# Patient Record
Sex: MALE | Race: WHITE | Employment: OTHER | URBAN - METROPOLITAN AREA
[De-identification: names, ages, dates, MRNs, and addresses within clinical notes are randomized per-mention and may not be internally consistent; named-entity substitution may affect disease eponyms.]

---

## 2017-01-24 ENCOUNTER — OFFICE VISIT (OUTPATIENT)
Dept: OPHTHALMOLOGY | Facility: CLINIC | Age: 62
End: 2017-01-24

## 2017-01-24 DIAGNOSIS — H20.023 RECURRENT IRITIS OF BOTH EYES: Primary | ICD-10-CM

## 2017-01-24 PROCEDURE — 99212 OFFICE O/P EST SF 10 MIN: CPT | Performed by: OPHTHALMOLOGY

## 2017-01-24 PROCEDURE — 99213 OFFICE O/P EST LOW 20 MIN: CPT | Performed by: OPHTHALMOLOGY

## 2017-01-24 RX ORDER — PREDNISOLONE ACETATE 10 MG/ML
SUSPENSION/ DROPS OPHTHALMIC
Qty: 1 BOTTLE | Refills: 1 | Status: SHIPPED | OUTPATIENT
Start: 2017-01-24 | End: 2017-02-23

## 2017-01-24 NOTE — PROGRESS NOTES
Bennie Chi is a 57year old male. HPI:     HPI     Patient is here for a recheck of iritis of both eyes. He is taking Pred forte 1% 2x a day in both eyes.   Patient states that his left eye became red on 1/21/17 and this morning he had pain of 2 daily PRN Disp: 120 tablet Rfl: 5   PARoxetine HCl 20 MG Oral Tab TAKE 1 AND 1/2 TABLETS(30 MG) BY MOUTH EVERY MORNING Disp: 135 tablet Rfl: 1   FLUTICASONE PROPIONATE 50 MCG/ACT Nasal Suspension SHAKE LIQUID AND USE 2 SPRAYS IN EACH NOSTRIL DAILY Disp: 3 No orders of the defined types were placed in this encounter.        Meds This Visit:    Signed Prescriptions Disp Refills    prednisoLONE acetate (PRED FORTE) 1 % Ophthalmic Suspension 1 Bottle 1      Sig: Use one drop in both eyes once a day

## 2017-01-24 NOTE — PATIENT INSTRUCTIONS
Recurrent iritis of both eyes  Decrease Pred forte to 1 drop in both eyes once a day until next visit in 4 weeks.

## 2017-02-08 ENCOUNTER — APPOINTMENT (OUTPATIENT)
Dept: GENERAL RADIOLOGY | Facility: HOSPITAL | Age: 62
End: 2017-02-08
Payer: COMMERCIAL

## 2017-02-08 ENCOUNTER — HOSPITAL ENCOUNTER (EMERGENCY)
Facility: HOSPITAL | Age: 62
Discharge: HOME | End: 2017-02-08
Payer: COMMERCIAL

## 2017-02-08 ENCOUNTER — APPOINTMENT (OUTPATIENT)
Dept: GENERAL RADIOLOGY | Facility: HOSPITAL | Age: 62
End: 2017-02-08
Attending: NURSE PRACTITIONER
Payer: COMMERCIAL

## 2017-02-08 VITALS
SYSTOLIC BLOOD PRESSURE: 153 MMHG | OXYGEN SATURATION: 95 % | HEART RATE: 70 BPM | RESPIRATION RATE: 17 BRPM | WEIGHT: 184.81 LBS | BODY MASS INDEX: 25 KG/M2 | TEMPERATURE: 99 F | DIASTOLIC BLOOD PRESSURE: 92 MMHG

## 2017-02-08 DIAGNOSIS — S70.01XA CONTUSION OF RIGHT HIP, INITIAL ENCOUNTER: ICD-10-CM

## 2017-02-08 DIAGNOSIS — M16.11 OSTEOARTHRITIS OF RIGHT HIP, UNSPECIFIED OSTEOARTHRITIS TYPE: Primary | ICD-10-CM

## 2017-02-08 PROCEDURE — 73502 X-RAY EXAM HIP UNI 2-3 VIEWS: CPT

## 2017-02-08 PROCEDURE — 99284 EMERGENCY DEPT VISIT MOD MDM: CPT

## 2017-02-08 PROCEDURE — 73552 X-RAY EXAM OF FEMUR 2/>: CPT

## 2017-02-08 PROCEDURE — 72100 X-RAY EXAM L-S SPINE 2/3 VWS: CPT

## 2017-02-08 RX ORDER — MELOXICAM 7.5 MG/1
7.5 TABLET ORAL DAILY
Qty: 20 TABLET | Refills: 0 | Status: SHIPPED | OUTPATIENT
Start: 2017-02-08 | End: 2017-10-10 | Stop reason: ALTCHOICE

## 2017-02-08 RX ORDER — MELOXICAM 7.5 MG/1
7.5 TABLET ORAL DAILY
Qty: 20 TABLET | Refills: 0 | Status: SHIPPED | OUTPATIENT
Start: 2017-02-08 | End: 2017-02-08

## 2017-02-09 NOTE — ED NOTES
Pt verbalized falling on the carpet as he he misplaced the step 2 weeks ago. Pt has a prosthesis on his r leg. Pt didn't hit his head. Since then pt is c/o of pain in his r hip. numbness or tingling. Pt is not on blood thinners.

## 2017-02-09 NOTE — ED PROVIDER NOTES
Patient Seen in: [facility] AND [facility] Emergency Department    History   Patient presents with:  Back Pain (musculoskeletal)    Stated Complaint:     HPI    Patient presents into the emergency room for evaluation of right hip pain.   Patient states approximat Suspension,  Use one drop in both eyes once a day   AmLODIPine Besylate 10 MG Oral Tab,  Take 1 tablet (10 mg total) by mouth daily. omeprazole 20 MG Oral Capsule Delayed Release,  Take 1 capsule (20 mg total) by mouth every morning.    Levothyroxine Sodi Temp 02/08/17 1712 98.1 °F (36.7 °C)   Temp src 02/08/17 2045 Temporal   SpO2 02/08/17 1712 97 %   O2 Device 02/08/17 1712 None (Room air)       Current:/92 mmHg  Pulse 70  Temp(Src) 98.7 °F (37.1 °C) (Temporal)  Resp 17  Wt 83.825 kg  SpO2 95% Patient denies a history of ulcer disease or GI bleed.       Disposition and Plan     Clinical Impression:  Osteoarthritis of right hip, unspecified osteoarthritis type  (primary encounter diagnosis)  Contusion of right hip, initial encounter    Disposition

## 2017-02-13 ENCOUNTER — OFFICE VISIT (OUTPATIENT)
Dept: INTERNAL MEDICINE CLINIC | Facility: CLINIC | Age: 62
End: 2017-02-13

## 2017-02-13 VITALS
HEART RATE: 73 BPM | WEIGHT: 185.63 LBS | SYSTOLIC BLOOD PRESSURE: 139 MMHG | DIASTOLIC BLOOD PRESSURE: 82 MMHG | TEMPERATURE: 98 F | BODY MASS INDEX: 25 KG/M2

## 2017-02-13 DIAGNOSIS — S70.01XS CONTUSION OF RIGHT HIP, SEQUELA: Primary | ICD-10-CM

## 2017-02-13 PROCEDURE — 99212 OFFICE O/P EST SF 10 MIN: CPT | Performed by: INTERNAL MEDICINE

## 2017-02-13 PROCEDURE — 99214 OFFICE O/P EST MOD 30 MIN: CPT | Performed by: INTERNAL MEDICINE

## 2017-02-13 NOTE — PROGRESS NOTES
HPI:    Patient ID: Eliz Todd is a 57year old male. he patient arrives for evaluation following a visit to [address] [place] [address] ER on 02/08/2017 for acute hip pain (right) due to a contusion after a fall.   HPI    Review of Systems   Constitutional: Negative fo Rfl: 1   Levothyroxine Sodium 100 MCG Oral Tab Take 1 tablet (100 mcg total) by mouth before breakfast. Disp: 90 tablet Rfl: 1   Dicyclomine HCl 20 MG Oral Tab Take 1 tablet (20 mg total) by mouth 4 (four) times daily.  Take four times daily PRN Disp: 120 t Weight: 185 lb 9.6 oz (84.188 kg)     Body mass index is 25.17 kg/(m^2).              ASSESSMENT/PLAN:   (S70.01XS) Contusion of right hip, sequela  (primary encounter diagnosis)  The patient arrives for evaluation following a visit to [address] [place] [address] on 02/08/201

## 2017-02-23 ENCOUNTER — OFFICE VISIT (OUTPATIENT)
Dept: OPHTHALMOLOGY | Facility: CLINIC | Age: 62
End: 2017-02-23

## 2017-02-23 DIAGNOSIS — R76.8 POSITIVE ANA (ANTINUCLEAR ANTIBODY): ICD-10-CM

## 2017-02-23 DIAGNOSIS — H20.023 RECURRENT IRITIS OF BOTH EYES: Primary | ICD-10-CM

## 2017-02-23 PROCEDURE — 99213 OFFICE O/P EST LOW 20 MIN: CPT | Performed by: OPHTHALMOLOGY

## 2017-02-23 PROCEDURE — 99212 OFFICE O/P EST SF 10 MIN: CPT | Performed by: OPHTHALMOLOGY

## 2017-02-23 RX ORDER — PREDNISOLONE ACETATE 10 MG/ML
SUSPENSION/ DROPS OPHTHALMIC
Qty: 1 BOTTLE | Refills: 0 | Status: SHIPPED | OUTPATIENT
Start: 2017-02-23 | End: 2017-04-06

## 2017-02-23 NOTE — ASSESSMENT & PLAN NOTE
Resolved. Continue to taper off of Pred forte. Use 1 drop every other day in both eyes until next visit in 6-8 weeks.

## 2017-02-23 NOTE — PROGRESS NOTES
Niki Aguilar is a 57year old male. HPI:     HPI     Patient is here for a recheck of bilateral recurrent iritis. Last visit was 1/24/17 and patient was told to use Pred forte once a day in both eyes and to follow up in 1 month.  Pt has been using Tab Take 1 tablet (20 mg total) by mouth 4 (four) times daily.  Take four times daily PRN Disp: 120 tablet Rfl: 5   PARoxetine HCl 20 MG Oral Tab TAKE 1 AND 1/2 TABLETS(30 MG) BY MOUTH EVERY MORNING Disp: 135 tablet Rfl: 1   FLUTICASONE PROPIONATE 50 MCG/AC antibody)  Will follow. Recurrent iritis of both eyes  Resolved. Continue to taper off of Pred forte. Use 1 drop every other day in both eyes until next visit in 6-8 weeks. No orders of the defined types were placed in this encounter.        Me

## 2017-02-23 NOTE — PATIENT INSTRUCTIONS
Positive MARLENE (antinuclear antibody)  Will follow. Recurrent iritis of both eyes  Resolved. Continue to taper off of Pred forte. Use 1 drop every other day in both eyes until next visit in 6-8 weeks.

## 2017-04-06 ENCOUNTER — OFFICE VISIT (OUTPATIENT)
Dept: OPHTHALMOLOGY | Facility: CLINIC | Age: 62
End: 2017-04-06

## 2017-04-06 DIAGNOSIS — H20.023 RECURRENT IRITIS OF BOTH EYES: Primary | ICD-10-CM

## 2017-04-06 PROCEDURE — 99212 OFFICE O/P EST SF 10 MIN: CPT | Performed by: OPHTHALMOLOGY

## 2017-04-06 PROCEDURE — 99213 OFFICE O/P EST LOW 20 MIN: CPT | Performed by: OPHTHALMOLOGY

## 2017-04-06 RX ORDER — PREDNISOLONE ACETATE 10 MG/ML
SUSPENSION/ DROPS OPHTHALMIC
Qty: 1 BOTTLE | Refills: 0 | Status: SHIPPED | OUTPATIENT
Start: 2017-04-06 | End: 2017-05-31

## 2017-04-06 NOTE — PROGRESS NOTES
Mili Murphy is a 57year old male. HPI:     HPI     Patient is here for a 6-8 week recheck of bilateral iritis. He is using 1 drop of Pred forte every other day in both eyes as directed.   Patient intermittently sees a [place] spot\" superotempora (100 mcg total) by mouth before breakfast. Disp: 90 tablet Rfl: 1   Dicyclomine HCl 20 MG Oral Tab Take 1 tablet (20 mg total) by mouth 4 (four) times daily.  Take four times daily PRN Disp: 120 tablet Rfl: 5   PARoxetine HCl 20 MG Oral Tab TAKE 1 AND 1/2 T iritis of both eyes  Decrease Pred forte to 1 drop 2 times a week (every 3 days)  in both eyes until next visit in 6-8 weeks for a dilated eye exam.       No orders of the defined types were placed in this encounter.        Meds This Visit:    Pending Presc

## 2017-04-06 NOTE — PATIENT INSTRUCTIONS
Recurrent iritis of both eyes  Decrease Pred forte to 1 drop 2 times a week (every 3 days)  in both eyes until next visit in 6-8 weeks for a dilated eye exam.

## 2017-04-06 NOTE — ASSESSMENT & PLAN NOTE
Decrease Pred forte to 1 drop 2 times a week (every 3 days)  in both eyes until next visit in 6-8 weeks for a dilated eye exam.

## 2017-04-10 ENCOUNTER — OFFICE VISIT (OUTPATIENT)
Dept: INTERNAL MEDICINE CLINIC | Facility: CLINIC | Age: 62
End: 2017-04-10

## 2017-04-10 VITALS
DIASTOLIC BLOOD PRESSURE: 76 MMHG | TEMPERATURE: 99 F | SYSTOLIC BLOOD PRESSURE: 129 MMHG | BODY MASS INDEX: 25 KG/M2 | WEIGHT: 186.13 LBS | HEART RATE: 67 BPM

## 2017-04-10 DIAGNOSIS — F32.89 OTHER DEPRESSION: ICD-10-CM

## 2017-04-10 DIAGNOSIS — I10 ESSENTIAL HYPERTENSION WITH GOAL BLOOD PRESSURE LESS THAN 130/85: Primary | ICD-10-CM

## 2017-04-10 DIAGNOSIS — E03.8 OTHER SPECIFIED HYPOTHYROIDISM: ICD-10-CM

## 2017-04-10 DIAGNOSIS — E78.5 HYPERLIPIDEMIA LDL GOAL <130: ICD-10-CM

## 2017-04-10 DIAGNOSIS — K21.9 GASTROESOPHAGEAL REFLUX DISEASE, ESOPHAGITIS PRESENCE NOT SPECIFIED: ICD-10-CM

## 2017-04-10 DIAGNOSIS — K58.9 IRRITABLE BOWEL SYNDROME, UNSPECIFIED TYPE: ICD-10-CM

## 2017-04-10 PROCEDURE — 99214 OFFICE O/P EST MOD 30 MIN: CPT | Performed by: INTERNAL MEDICINE

## 2017-04-10 PROCEDURE — 99212 OFFICE O/P EST SF 10 MIN: CPT | Performed by: INTERNAL MEDICINE

## 2017-04-10 RX ORDER — OMEPRAZOLE 20 MG/1
20 CAPSULE, DELAYED RELEASE ORAL EVERY MORNING
Qty: 90 CAPSULE | Refills: 1 | Status: SHIPPED | OUTPATIENT
Start: 2017-04-10 | End: 2017-10-10

## 2017-04-10 RX ORDER — TERBINAFINE HYDROCHLORIDE 250 MG/1
TABLET ORAL
Refills: 2 | COMMUNITY
Start: 2017-03-21 | End: 2017-10-10

## 2017-04-10 RX ORDER — DICYCLOMINE HCL 20 MG
20 TABLET ORAL
Qty: 120 TABLET | Refills: 5 | Status: SHIPPED | OUTPATIENT
Start: 2017-04-10 | End: 2017-10-10 | Stop reason: ALTCHOICE

## 2017-04-10 RX ORDER — AMLODIPINE BESYLATE 10 MG/1
10 TABLET ORAL DAILY
Qty: 90 TABLET | Refills: 1 | Status: SHIPPED | OUTPATIENT
Start: 2017-04-10 | End: 2017-10-10

## 2017-04-10 RX ORDER — PAROXETINE HYDROCHLORIDE 20 MG/1
TABLET, FILM COATED ORAL
Qty: 135 TABLET | Refills: 1 | Status: SHIPPED | OUTPATIENT
Start: 2017-04-10 | End: 2017-10-10

## 2017-04-10 RX ORDER — LEVOTHYROXINE SODIUM 0.1 MG/1
100 TABLET ORAL
Qty: 90 TABLET | Refills: 1 | Status: SHIPPED | OUTPATIENT
Start: 2017-04-10 | End: 2017-10-10

## 2017-04-10 NOTE — PROGRESS NOTES
HPI:    Patient ID: Eliz Todd is a 57year old male. Hypertension  This is a chronic problem. The current episode started more than 1 year ago. The problem has been resolved since onset. The problem is controlled.  Associated symptoms include an coronary artery disease include dyslipidemia, hypertension and male sex. Depression  This is a chronic problem. The current episode started more than 1 year ago. The problem has been resolved. Pertinent negatives include no chest pain, chills or fever.  Lobito Del Angel mouth before breakfast. Disp: 90 tablet Rfl: 1   omeprazole 20 MG Oral Capsule Delayed Release Take 1 capsule (20 mg total) by mouth every morning.  Disp: 90 capsule Rfl: 1   PARoxetine HCl 20 MG Oral Tab TAKE 1 AND 1/2 TABLETS(30 MG) BY MOUTH EVERY MORNING is alert and oriented to person, place, and time. Skin: He is not diaphoretic. Psychiatric: He has a normal mood and affect. His speech is normal and behavior is normal. He does not exhibit a depressed mood.         04/10/17  1144   BP: 129/76   Pulse: has been taking omeprazole 20 mg with significant improvement and was instructed to continue medication as prescribed.     (F32.89) Other depression  PMHx of depression. Patient has been taking Paroxetine 20 mg with significant improvement.  Denies any mood agree that the record reflects my personal performance and is accurate and complete.   Kerri Colon MD, 4/10/2017, 12:21 PM

## 2017-04-17 ENCOUNTER — APPOINTMENT (OUTPATIENT)
Dept: LAB | Facility: HOSPITAL | Age: 62
End: 2017-04-17
Attending: INTERNAL MEDICINE
Payer: COMMERCIAL

## 2017-04-17 DIAGNOSIS — E78.5 HYPERLIPIDEMIA LDL GOAL <130: ICD-10-CM

## 2017-04-17 DIAGNOSIS — K21.9 GASTROESOPHAGEAL REFLUX DISEASE, ESOPHAGITIS PRESENCE NOT SPECIFIED: ICD-10-CM

## 2017-04-17 PROCEDURE — 80061 LIPID PANEL: CPT

## 2017-04-17 PROCEDURE — 84443 ASSAY THYROID STIM HORMONE: CPT

## 2017-04-17 PROCEDURE — 36415 COLL VENOUS BLD VENIPUNCTURE: CPT

## 2017-04-17 PROCEDURE — 80053 COMPREHEN METABOLIC PANEL: CPT

## 2017-04-24 ENCOUNTER — TELEPHONE (OUTPATIENT)
Dept: INTERNAL MEDICINE CLINIC | Facility: CLINIC | Age: 62
End: 2017-04-24

## 2017-05-01 ENCOUNTER — TELEPHONE (OUTPATIENT)
Dept: OPHTHALMOLOGY | Facility: CLINIC | Age: 62
End: 2017-05-01

## 2017-05-01 NOTE — TELEPHONE ENCOUNTER
Pt have an appt schedule for Tuesday, May 30, 2017.  Pt would like to change the date, but want a sooner appt than August. pls advise

## 2017-05-31 ENCOUNTER — OFFICE VISIT (OUTPATIENT)
Dept: OPHTHALMOLOGY | Facility: CLINIC | Age: 62
End: 2017-05-31

## 2017-05-31 DIAGNOSIS — H25.13 AGE-RELATED NUCLEAR CATARACT OF BOTH EYES: ICD-10-CM

## 2017-05-31 DIAGNOSIS — R76.8 POSITIVE ANA (ANTINUCLEAR ANTIBODY): ICD-10-CM

## 2017-05-31 DIAGNOSIS — H20.023 RECURRENT IRITIS OF BOTH EYES: Primary | ICD-10-CM

## 2017-05-31 PROCEDURE — 92014 COMPRE OPH EXAM EST PT 1/>: CPT | Performed by: OPHTHALMOLOGY

## 2017-05-31 PROCEDURE — 92015 DETERMINE REFRACTIVE STATE: CPT | Performed by: OPHTHALMOLOGY

## 2017-05-31 RX ORDER — PREDNISOLONE ACETATE 10 MG/ML
SUSPENSION/ DROPS OPHTHALMIC
Qty: 1 BOTTLE | Refills: 0 | Status: SHIPPED | OUTPATIENT
Start: 2017-05-31 | End: 2017-12-05

## 2017-05-31 NOTE — ASSESSMENT & PLAN NOTE
Discussed diagnosis of cataracts in detail with patient. Cataract surgery not indicated at this time due to vision level. New glasses today; suggest update.    Discussed that cause of decreased vision in the right eye is due to increased cataract which

## 2017-05-31 NOTE — PROGRESS NOTES
Diamond Meigs is a 57year old male. HPI:     HPI     Here for a recheck of iritis OU and dilated exam. Pt states that he has been using one drop of Pred [place] every 3rd day as directed at his last visit on 4/6/17.  Pt complains of blurred vision tablet Rfl: 1   omeprazole 20 MG Oral Capsule Delayed Release Take 1 capsule (20 mg total) by mouth every morning.  Disp: 90 capsule Rfl: 1   PARoxetine HCl 20 MG Oral Tab TAKE 1 AND 1/2 TABLETS(30 MG) BY MOUTH EVERY MORNING Disp: 135 tablet Rfl: 1   Meloxi Cornea trace Guttata Trace Guttata    Anterior Chamber Deep and quiet Deep and quiet    Iris Normal Normal    Lens 1-2+ Nuclear sclerosis, Trace Posterior subcapsular cataract 1-2+ Nuclear sclerosis, no PSC    Vitreous Foley ring Vitreous floaters      Fun

## 2017-05-31 NOTE — PATIENT INSTRUCTIONS
Recurrent iritis of both eyes  Decrease Pred forte to 1 drop once a week in both eyes until next visit in approx. 3 months. Positive MARLENE (antinuclear antibody)  Will continue to follow.      Age-related nuclear cataract of both eyes   Discussed diagnos

## 2017-06-30 ENCOUNTER — OFFICE VISIT (OUTPATIENT)
Dept: DERMATOLOGY CLINIC | Facility: CLINIC | Age: 62
End: 2017-06-30

## 2017-06-30 DIAGNOSIS — D23.9 BENIGN NEOPLASM OF SKIN, UNSPECIFIED LOCATION: ICD-10-CM

## 2017-06-30 DIAGNOSIS — L27.0 DRUG ERUPTION: Primary | ICD-10-CM

## 2017-06-30 PROCEDURE — 99212 OFFICE O/P EST SF 10 MIN: CPT | Performed by: DERMATOLOGY

## 2017-06-30 PROCEDURE — 99202 OFFICE O/P NEW SF 15 MIN: CPT | Performed by: DERMATOLOGY

## 2017-07-17 NOTE — PROGRESS NOTES
Tequila Madera is a 57year old male. Patient presents with:  Lesion: LOV 7/31/2013. Pt presenting with red lesions to upper back. Pt states started 1 month prior. Pt states may be related to terbinafine.              Terbinafine    Current Outpati Date   • Acute iritis of both eyes 1/25/2016   • Anxiety state, unspecified    • Congenital leg length inequality    • Hyperlipidemia    • Recurrent iritis of both eyes 1/25/2016   • Thyroid disease    • Unspecified essential hypertension       Social Hist Disp:  Rfl:    B Complex-C-Folic Acid (B COMPLEX + C TR) Oral Tab CR Take  by mouth 3 (three) times daily. Disp:  Rfl:    Niacin  MG Oral Tab CR Take 500 mg by mouth 3 (three) times daily.    Disp:  Rfl:      Allergies:     Terbinafine             Johnson Terbinafine prescribed initially from podiatry for toenails took March through May. Nails have improved    Patient presents with concerns above. Denies any other systemic complaints.   No fevers, chills, night sweats, photosensitivity, lymph node swelling allergy in differential.  Consider patch testing. Patient will let us know how they are doing over the next several weeks. Await clinical response to above therapy. No other suspicious lesions.    Signs and symptoms of skin cancer, ABCDE's of melanom

## 2017-08-01 ENCOUNTER — OFFICE VISIT (OUTPATIENT)
Dept: OPHTHALMOLOGY | Facility: CLINIC | Age: 62
End: 2017-08-01

## 2017-08-01 DIAGNOSIS — H20.023 RECURRENT IRITIS OF BOTH EYES: Primary | ICD-10-CM

## 2017-08-01 DIAGNOSIS — H25.13 AGE-RELATED NUCLEAR CATARACT OF BOTH EYES: ICD-10-CM

## 2017-08-01 PROCEDURE — 99213 OFFICE O/P EST LOW 20 MIN: CPT | Performed by: OPHTHALMOLOGY

## 2017-08-01 PROCEDURE — 99212 OFFICE O/P EST SF 10 MIN: CPT | Performed by: OPHTHALMOLOGY

## 2017-08-01 NOTE — ASSESSMENT & PLAN NOTE
Cataract is worsening in the right eye. Discussed with patient that cataract in the right eye is advanced enough at this time to consider surgery; it would be patient's choice. Discussed options such as surgery or change of glasses RX.   Discussed surgic

## 2017-08-01 NOTE — PROGRESS NOTES
Niki Aguilar is a 57year old male. HPI:     HPI     Here for a recheck of chronic iritis OU. Pt has been using Pred Forte one drop once a week in both eyes as directed.  Pt complains of blurred vision in his right eye with his new glasses Rx and i Oral Capsule Delayed Release Take 1 capsule (20 mg total) by mouth every morning.  Disp: 90 capsule Rfl: 1   PARoxetine HCl 20 MG Oral Tab TAKE 1 AND 1/2 TABLETS(30 MG) BY MOUTH EVERY MORNING Disp: 135 tablet Rfl: 1   Meloxicam (MOBIC) 7.5 MG Oral Tab Take Sphere Cylinder Axis Add    Right -5.00 +3.25 180 +2.75    Left -3.25 +1.50 165 +2.75    Type:  Progressive bifocal          Manifest Refraction       Sphere Cylinder [place] Add [place]    Right -5.75 +3.00 180 20/80- +2.75 20/30    Left

## 2017-08-01 NOTE — PATIENT INSTRUCTIONS
Age-related nuclear cataract of both eyes  Cataract is worsening in the right eye. Discussed with patient that cataract in the right eye is advanced enough at this time to consider surgery; it would be patient's choice.   Discussed options such as surgery

## 2017-09-28 ENCOUNTER — TELEPHONE (OUTPATIENT)
Dept: OPHTHALMOLOGY | Facility: CLINIC | Age: 62
End: 2017-09-28

## 2017-10-10 ENCOUNTER — OFFICE VISIT (OUTPATIENT)
Dept: INTERNAL MEDICINE CLINIC | Facility: CLINIC | Age: 62
End: 2017-10-10

## 2017-10-10 VITALS
HEART RATE: 86 BPM | WEIGHT: 183.38 LBS | BODY MASS INDEX: 25 KG/M2 | DIASTOLIC BLOOD PRESSURE: 77 MMHG | TEMPERATURE: 98 F | SYSTOLIC BLOOD PRESSURE: 144 MMHG

## 2017-10-10 DIAGNOSIS — F32.89 OTHER DEPRESSION: ICD-10-CM

## 2017-10-10 DIAGNOSIS — E78.5 HYPERLIPIDEMIA LDL GOAL <100: ICD-10-CM

## 2017-10-10 DIAGNOSIS — I10 ESSENTIAL HYPERTENSION WITH GOAL BLOOD PRESSURE LESS THAN 130/85: ICD-10-CM

## 2017-10-10 DIAGNOSIS — Z12.5 PROSTATE CANCER SCREENING: Primary | ICD-10-CM

## 2017-10-10 DIAGNOSIS — E03.8 OTHER SPECIFIED HYPOTHYROIDISM: ICD-10-CM

## 2017-10-10 DIAGNOSIS — K21.9 GASTROESOPHAGEAL REFLUX DISEASE, ESOPHAGITIS PRESENCE NOT SPECIFIED: ICD-10-CM

## 2017-10-10 PROCEDURE — 99212 OFFICE O/P EST SF 10 MIN: CPT | Performed by: INTERNAL MEDICINE

## 2017-10-10 PROCEDURE — 99214 OFFICE O/P EST MOD 30 MIN: CPT | Performed by: INTERNAL MEDICINE

## 2017-10-10 RX ORDER — AMLODIPINE BESYLATE 10 MG/1
10 TABLET ORAL DAILY
Qty: 90 TABLET | Refills: 1 | Status: SHIPPED | OUTPATIENT
Start: 2017-10-10 | End: 2018-04-14

## 2017-10-10 RX ORDER — PAROXETINE HYDROCHLORIDE 20 MG/1
TABLET, FILM COATED ORAL
Qty: 135 TABLET | Refills: 1 | Status: SHIPPED | OUTPATIENT
Start: 2017-10-10 | End: 2018-04-08

## 2017-10-10 RX ORDER — OMEPRAZOLE 20 MG/1
20 CAPSULE, DELAYED RELEASE ORAL EVERY MORNING
Qty: 90 CAPSULE | Refills: 1 | Status: SHIPPED | OUTPATIENT
Start: 2017-10-10 | End: 2018-04-14

## 2017-10-10 RX ORDER — LEVOTHYROXINE SODIUM 0.1 MG/1
100 TABLET ORAL
Qty: 90 TABLET | Refills: 1 | Status: SHIPPED | OUTPATIENT
Start: 2017-10-10 | End: 2018-06-02

## 2017-10-10 RX ORDER — CLONAZEPAM 0.5 MG/1
0.5 TABLET ORAL NIGHTLY PRN
Qty: 30 TABLET | Refills: 3 | Status: SHIPPED | OUTPATIENT
Start: 2017-10-10 | End: 2021-11-23 | Stop reason: ALTCHOICE

## 2017-10-10 RX ORDER — CLONAZEPAM 0.5 MG/1
0.5 TABLET ORAL NIGHTLY PRN
COMMUNITY
End: 2017-10-10

## 2017-10-10 NOTE — PROGRESS NOTES
HPI:    Patient ID: Iain Montes is a 57year old male. HPI  Hypertension  This is a chronic problem. The current episode started more than 1 year ago. The problem has been resolved since onset. The problem is controlled.  Associated symptoms inclu Review of Systems   Constitutional: Negative for chills and fever. HENT: Negative for voice change. Respiratory: Negative for chest tightness and shortness of breath. Cardiovascular: Negative for chest pain.    Gastrointestinal: Negative for a every morning. Disp: 90 capsule Rfl: 1   ClonazePAM 0.5 MG Oral Tab Take 1 tablet (0.5 mg total) by mouth nightly as needed for Anxiety.  Disp: 30 tablet Rfl: 3   prednisoLONE acetate (PRED FORTE) 1 % Ophthalmic Suspension Use one drop in both eyes once a w oriented to person, place, and time. Skin: Skin is dry. Psychiatric: He has a normal mood and affect. Thought content normal.   Nursing note and vitals reviewed.        10/10/17  1341   BP: 144/77   Pulse: 86   Temp: 97.8 °F (36.6 °C)   TempSrc: Oral as prescribed.    (K21.9) Gastroesophageal reflux disease, esophagitis presence not specified  - Patient is currently taking  omeprazole 20 MG with significant improvement and was instructed to continue medication as prescribed.    (E78.5) Hyperlipidemia L PM

## 2017-10-16 ENCOUNTER — LAB ENCOUNTER (OUTPATIENT)
Dept: LAB | Facility: HOSPITAL | Age: 62
End: 2017-10-16
Attending: INTERNAL MEDICINE
Payer: COMMERCIAL

## 2017-10-16 DIAGNOSIS — I10 ESSENTIAL HYPERTENSION WITH GOAL BLOOD PRESSURE LESS THAN 130/85: ICD-10-CM

## 2017-10-16 DIAGNOSIS — F32.89 OTHER DEPRESSION: ICD-10-CM

## 2017-10-16 DIAGNOSIS — Z12.5 PROSTATE CANCER SCREENING: ICD-10-CM

## 2017-10-16 DIAGNOSIS — E78.5 HYPERLIPIDEMIA LDL GOAL <100: ICD-10-CM

## 2017-10-16 PROCEDURE — 36415 COLL VENOUS BLD VENIPUNCTURE: CPT

## 2017-10-16 PROCEDURE — 80061 LIPID PANEL: CPT

## 2017-10-16 PROCEDURE — 80053 COMPREHEN METABOLIC PANEL: CPT

## 2017-10-16 PROCEDURE — 84443 ASSAY THYROID STIM HORMONE: CPT

## 2017-10-27 ENCOUNTER — OFFICE VISIT (OUTPATIENT)
Dept: INTERNAL MEDICINE CLINIC | Facility: CLINIC | Age: 62
End: 2017-10-27

## 2017-10-27 VITALS
BODY MASS INDEX: 24.79 KG/M2 | WEIGHT: 183 LBS | DIASTOLIC BLOOD PRESSURE: 82 MMHG | HEART RATE: 71 BPM | SYSTOLIC BLOOD PRESSURE: 152 MMHG | TEMPERATURE: 98 F | HEIGHT: 72 IN

## 2017-10-27 DIAGNOSIS — F41.9 ANXIETY: ICD-10-CM

## 2017-10-27 DIAGNOSIS — K21.9 GASTROESOPHAGEAL REFLUX DISEASE WITHOUT ESOPHAGITIS: ICD-10-CM

## 2017-10-27 DIAGNOSIS — E03.9 ACQUIRED HYPOTHYROIDISM: ICD-10-CM

## 2017-10-27 DIAGNOSIS — I10 ESSENTIAL HYPERTENSION: Primary | ICD-10-CM

## 2017-10-27 DIAGNOSIS — E78.00 HYPERCHOLESTEREMIA: ICD-10-CM

## 2017-10-27 PROBLEM — H25.091 AGE-RELATED INCIPIENT CATARACT OF RIGHT EYE: Status: ACTIVE | Noted: 2017-10-27

## 2017-10-27 PROCEDURE — 99214 OFFICE O/P EST MOD 30 MIN: CPT | Performed by: INTERNAL MEDICINE

## 2017-10-27 PROCEDURE — 99212 OFFICE O/P EST SF 10 MIN: CPT | Performed by: INTERNAL MEDICINE

## 2017-10-27 RX ORDER — ATORVASTATIN CALCIUM 10 MG/1
10 TABLET, FILM COATED ORAL NIGHTLY
Qty: 90 TABLET | Refills: 3 | Status: SHIPPED | OUTPATIENT
Start: 2017-10-27 | End: 2018-10-27

## 2017-10-28 NOTE — PROGRESS NOTES
Patient ID: Kang Munoz is a 57year old male. Patient presents with:  Forms Completion  Lab: Review test results       HISTORY OF PRESENT ILLNESS:   HPI  Patient presents for above. Here to establish care after prior physician resigned.   History Surgical History:  09/26/2017: CATARACT  09/26/2017: CATARACT EXTRACTION EXTRACAPSULAR W/ INTRAOCUL* Right      Comment: Dr. Denis De Luna @ [facility]  2013: COLONOSCOPY  1963: [address] Right  1970:  INGUINAL HERNIA REPAIR Left  No date: LEG/ANKLE SURGERY [place] MG Oral Tab, Take 1,000 mg by mouth 2 (two) times daily. , Disp: , Rfl:     Allergies:  Terbinafine             Rash    Comment:Likely SCLE, generalized eruption      Social History  Social History   Marital status:   Spouse name: N/A    Years of edu without esophagitis  · Continue PPI. · Symptoms well controlled. 5. Anxiety  · Continue medications. · We will modify if needed. Return in about 3 months (around 1/27/2018).     Tilford Boas, MD  10/27/2017

## 2017-12-05 ENCOUNTER — OFFICE VISIT (OUTPATIENT)
Dept: OPHTHALMOLOGY | Facility: CLINIC | Age: 62
End: 2017-12-05

## 2017-12-05 DIAGNOSIS — Z96.1 PSEUDOPHAKIA, RIGHT EYE: ICD-10-CM

## 2017-12-05 DIAGNOSIS — H20.13 CHRONIC IRITIS OF BOTH EYES: Primary | ICD-10-CM

## 2017-12-05 DIAGNOSIS — R76.8 POSITIVE ANA (ANTINUCLEAR ANTIBODY): ICD-10-CM

## 2017-12-05 PROCEDURE — 99213 OFFICE O/P EST LOW 20 MIN: CPT | Performed by: OPHTHALMOLOGY

## 2017-12-05 PROCEDURE — 99212 OFFICE O/P EST SF 10 MIN: CPT | Performed by: OPHTHALMOLOGY

## 2017-12-05 RX ORDER — PREDNISOLONE ACETATE 10 MG/ML
SUSPENSION/ DROPS OPHTHALMIC
Qty: 1 BOTTLE | Refills: 0 | Status: SHIPPED | OUTPATIENT
Start: 2017-12-05 | End: 2018-04-05

## 2017-12-05 NOTE — PATIENT INSTRUCTIONS
Chronic iritis of both eyes  Iritis is under control with current regimen of Pred forte once a week in both eyes. Stop Pred forte in the left eye for a trial.  Continue Pred forte once a week in the right eye.       Positive MARLENE (antinuclear antibody)  Ty Horton

## 2017-12-05 NOTE — PROGRESS NOTES
Darek Stacy is a 57year old male. HPI:     HPI     Here for a recheck of chronic iritis OU. Pt has been using Pred Forte one drop once a week in both eyes as directed.  Patient had Cataract surgery OD on 9/26/17 with Dr. Nolan Pruitt.  Pt did not get new 90 tablet Rfl: 3   AmLODIPine Besylate 10 MG Oral Tab Take 1 tablet (10 mg total) by mouth daily.  Disp: 90 tablet Rfl: 1   Levothyroxine Sodium 100 MCG Oral Tab Take 1 tablet (100 mcg total) by mouth before breakfast. Disp: 90 tablet Rfl: 1   PARoxetine HC Dermatochalasis, Meibomian gland dysfunction Dermatochalasis, Meibomian gland dysfunction    Conjunctiva/Sclera Temp pinguecula, non injected  Nasal/temp pinguecula, non injected     Cornea no KP, trace Guttata no KP , Trace Guttata    Anterior Chamber [address] [facility]

## 2017-12-05 NOTE — ASSESSMENT & PLAN NOTE
Iritis is under control with current regimen of Pred forte once a week in both eyes. Stop Pred forte in the left eye for a trial.  Continue Pred forte once a week in the right eye.

## 2018-02-02 ENCOUNTER — OFFICE VISIT (OUTPATIENT)
Dept: INTERNAL MEDICINE CLINIC | Facility: CLINIC | Age: 63
End: 2018-02-02

## 2018-02-02 VITALS
SYSTOLIC BLOOD PRESSURE: 126 MMHG | HEIGHT: 72 IN | BODY MASS INDEX: 24.79 KG/M2 | WEIGHT: 183 LBS | HEART RATE: 71 BPM | TEMPERATURE: 98 F | DIASTOLIC BLOOD PRESSURE: 76 MMHG

## 2018-02-02 DIAGNOSIS — K58.2 IRRITABLE BOWEL SYNDROME WITH BOTH CONSTIPATION AND DIARRHEA: Primary | ICD-10-CM

## 2018-02-02 DIAGNOSIS — I10 ESSENTIAL HYPERTENSION: ICD-10-CM

## 2018-02-02 PROCEDURE — 99212 OFFICE O/P EST SF 10 MIN: CPT | Performed by: INTERNAL MEDICINE

## 2018-02-02 PROCEDURE — 99214 OFFICE O/P EST MOD 30 MIN: CPT | Performed by: INTERNAL MEDICINE

## 2018-02-02 NOTE — PROGRESS NOTES
Patient ID: Kavita Montejo is a 70year old male. Patient presents with: Follow - Up: IBS flare up last night, and sore R shoulder       HISTORY OF PRESENT ILLNESS:   HPI  Patient presents for above. Here for multiple issues.   History of irritable Right  1970:  INGUINAL HERNIA REPAIR Left  No date: LEG/ANKLE SURGERY PROC UNLISTED Right      Comment: multiple reconstructive procedures  2013: UPPER GI ENDOSCOPY,BIOPSY      Current Outpatient Prescriptions:   •  Linaclotide (LINZESS) 145 MCG Oral Cap, T   Spouse name: N/A    Years of education: N/A  Number of children: 3     Occupational History  [employer] - management  retired     Social History Main Topics   Smoking status: Former Smoker     Quit date: 1/1/1983    Smokeless tobacco: Never Used

## 2018-02-07 ENCOUNTER — TELEPHONE (OUTPATIENT)
Dept: INTERNAL MEDICINE CLINIC | Facility: CLINIC | Age: 63
End: 2018-02-07

## 2018-02-12 NOTE — TELEPHONE ENCOUNTER
PA approved effective 12/10/2017-5/9/2018; [facility] pharmacy notified of the approval. Pharmacist states patient picked up Linzess 145 mcg cap on 2/10/2018 paid for by insurance.

## 2018-04-05 ENCOUNTER — OFFICE VISIT (OUTPATIENT)
Dept: OPHTHALMOLOGY | Facility: CLINIC | Age: 63
End: 2018-04-05

## 2018-04-05 DIAGNOSIS — H25.13 AGE-RELATED NUCLEAR CATARACT OF BOTH EYES: ICD-10-CM

## 2018-04-05 DIAGNOSIS — H26.491 AFTER-CATARACT OF RIGHT EYE WITH VISION OBSCURED: ICD-10-CM

## 2018-04-05 DIAGNOSIS — R76.8 POSITIVE ANA (ANTINUCLEAR ANTIBODY): ICD-10-CM

## 2018-04-05 DIAGNOSIS — H20.13 CHRONIC IRITIS OF BOTH EYES: Primary | ICD-10-CM

## 2018-04-05 DIAGNOSIS — H25.12 AGE-RELATED NUCLEAR CATARACT OF LEFT EYE: ICD-10-CM

## 2018-04-05 DIAGNOSIS — Z96.1 PSEUDOPHAKIA, RIGHT EYE: ICD-10-CM

## 2018-04-05 PROBLEM — H25.11 AGE-RELATED NUCLEAR CATARACT OF RIGHT EYE: Status: ACTIVE | Noted: 2017-10-27

## 2018-04-05 PROBLEM — H25.11 AGE-RELATED NUCLEAR CATARACT OF RIGHT EYE: Status: RESOLVED | Noted: 2017-10-27 | Resolved: 2018-04-05

## 2018-04-05 PROCEDURE — 92014 COMPRE OPH EXAM EST PT 1/>: CPT | Performed by: OPHTHALMOLOGY

## 2018-04-05 PROCEDURE — 92015 DETERMINE REFRACTIVE STATE: CPT | Performed by: OPHTHALMOLOGY

## 2018-04-05 RX ORDER — PREDNISOLONE ACETATE 10 MG/ML
SUSPENSION/ DROPS OPHTHALMIC
Qty: 1 BOTTLE | Refills: 0 | Status: SHIPPED | OUTPATIENT
Start: 2018-04-05 | End: 2018-04-26

## 2018-04-05 NOTE — PROGRESS NOTES
Mark Bartlett is a 70year old male. HPI:     HPI     Pt has been using Pred Forte-one drop in the right eye once a week. Pt had right PC IOL with Dr. Livia Sue on 9/26/17. Pt states that last week he had eye pain and redness in his left eye.  ( Pt used MG Oral Tab Take 1 tablet (10 mg total) by mouth nightly. Disp: 90 tablet Rfl: 3   AmLODIPine Besylate 10 MG Oral Tab Take 1 tablet (10 mg total) by mouth daily.  Disp: 90 tablet Rfl: 1   Levothyroxine Sodium 100 MCG Oral Tab Take 1 tablet (100 mcg total) b Pupils    Right PERRL    Left PERRL          Visual Fields       Left Right     Full Full          Extraocular Movement       Right Left     Full, Ortho Full, Ortho          Dilation     Both eyes:  Paremyd @ 2:43 PM            Slit Lamp and Fundus Exam future. Discussed risks, benefits, treatments and alternatives. Information given and reviewed with patient. Patient elects to have YAG laser done. No orders of the defined types were placed in this encounter.       Meds This Visit:    Signed Pre

## 2018-04-05 NOTE — PATIENT INSTRUCTIONS
Chronic iritis of both eyes  Continue Pred forte once a week in the right eye. New flare up of iritis in the left eye. Start Pred forte in the left eye- Use 1 drop 2 times a day in the left eye for 14 days, then decrease to once a day until next visit.

## 2018-04-05 NOTE — ASSESSMENT & PLAN NOTE
Continue Pred forte once a week in the right eye. New flare up of iritis in the left eye. Start Pred forte in the left eye- Use 1 drop 2 times a day in the left eye for 14 days, then decrease to once a day until next visit.

## 2018-04-07 ENCOUNTER — OFFICE VISIT (OUTPATIENT)
Dept: OPHTHALMOLOGY | Facility: CLINIC | Age: 63
End: 2018-04-07

## 2018-04-07 DIAGNOSIS — H26.491 AFTER-CATARACT OF RIGHT EYE WITH VISION OBSCURED: Primary | ICD-10-CM

## 2018-04-07 PROCEDURE — 66821 AFTER CATARACT LASER SURGERY: CPT | Performed by: OPHTHALMOLOGY

## 2018-04-07 NOTE — PROGRESS NOTES
Liz Nelson is a 70year old male. HPI:     HPI     Patient is here for a Yag capsulotomy in the right eye. Patient complains of blurry vision OD.       Last edited by Willian Blevins OT on 4/7/2018 10:40 AM. (History)        Patient History:  Pa total) by mouth daily.  Disp: 90 tablet Rfl: 1   Levothyroxine Sodium 100 MCG Oral Tab Take 1 tablet (100 mcg total) by mouth before breakfast. Disp: 90 tablet Rfl: 1   PARoxetine HCl 20 MG Oral Tab TAKE 1 AND 1/2 TABLETS(30 MG) BY MOUTH EVERY MORNING Disp: dilate right eye.          Orders Placed This Encounter      Yag Capsulotomy - OD - Right Eye    Meds This Visit:    No prescriptions requested or ordered in this encounter     Follow up instructions:  Return for 2-4 weeks PO YAG on 4/26/18 .    4/7/2018  S

## 2018-04-07 NOTE — PATIENT INSTRUCTIONS
After-cataract of right eye with vision obscured  Right YAG Laser Capsulotomy was done in the office today by Dr. Flaco Sotelo with no complications. Pt will return 2-4 weeks PO dilate right eye.

## 2018-04-08 DIAGNOSIS — F32.89 OTHER DEPRESSION: ICD-10-CM

## 2018-04-09 RX ORDER — PAROXETINE HYDROCHLORIDE 20 MG/1
TABLET, FILM COATED ORAL
Qty: 135 TABLET | Refills: 0 | Status: SHIPPED | OUTPATIENT
Start: 2018-04-09 | End: 2018-07-06

## 2018-04-09 RX ORDER — PAROXETINE HYDROCHLORIDE 20 MG/1
TABLET, FILM COATED ORAL
Qty: 135 TABLET | Refills: 0 | OUTPATIENT
Start: 2018-04-09

## 2018-04-09 NOTE — TELEPHONE ENCOUNTER
Refill Protocol Appointment Criteria  · Appointment scheduled in the past 6 months or in the next 3 months  Recent Outpatient Visits            2 days ago After-cataract of right eye with vision obscured    [facility]

## 2018-04-09 NOTE — TELEPHONE ENCOUNTER
Pending Prescriptions Disp Refills    PAROXETINE HCL 20 MG Oral Tab [Pharmacy Med Name: PAROXETINE 20MG TABLETS] 135 tablet 0     Sig: TAKE 1 AND 1/2 TABLETS BY MOUTH EVERY MORNING           Refill approved per protocol.

## 2018-04-14 DIAGNOSIS — K21.9 GASTROESOPHAGEAL REFLUX DISEASE, ESOPHAGITIS PRESENCE NOT SPECIFIED: ICD-10-CM

## 2018-04-14 DIAGNOSIS — I10 ESSENTIAL HYPERTENSION WITH GOAL BLOOD PRESSURE LESS THAN 130/85: ICD-10-CM

## 2018-04-16 RX ORDER — AMLODIPINE BESYLATE 10 MG/1
TABLET ORAL
Qty: 90 TABLET | Refills: 0 | Status: SHIPPED | OUTPATIENT
Start: 2018-04-16 | End: 2018-07-16

## 2018-04-16 RX ORDER — OMEPRAZOLE 20 MG/1
CAPSULE, DELAYED RELEASE ORAL
Qty: 90 CAPSULE | Refills: 0 | Status: SHIPPED | OUTPATIENT
Start: 2018-04-16 | End: 2018-07-16

## 2018-04-16 NOTE — TELEPHONE ENCOUNTER
Hypertensive Medications  Protocol Criteria:  · Appointment scheduled in the past 6 months or in the next 3 months  · BMP or CMP in the past 12 months  · Creatinine result < 2  Recent Outpatient Visits            1 week ago After-cataract of right eye with After-cataract of right eye with vision obscured    [facility] Mariano Barrett MD    Office Visit    1 week ago Chronic iritis of both eyes    [facility] Mariano Barrett MD    Of

## 2018-04-26 ENCOUNTER — OFFICE VISIT (OUTPATIENT)
Dept: OPHTHALMOLOGY | Facility: CLINIC | Age: 63
End: 2018-04-26

## 2018-04-26 DIAGNOSIS — Z98.890 POST-OPERATIVE STATE: Primary | ICD-10-CM

## 2018-04-26 DIAGNOSIS — H20.023 RECURRENT IRITIS OF BOTH EYES: Primary | ICD-10-CM

## 2018-04-26 PROBLEM — H26.491 AFTER-CATARACT OF RIGHT EYE WITH VISION OBSCURED: Status: RESOLVED | Noted: 2018-04-05 | Resolved: 2018-04-26

## 2018-04-26 PROCEDURE — 99024 POSTOP FOLLOW-UP VISIT: CPT | Performed by: OPHTHALMOLOGY

## 2018-04-26 PROCEDURE — 99212 OFFICE O/P EST SF 10 MIN: CPT | Performed by: OPHTHALMOLOGY

## 2018-04-26 RX ORDER — PREDNISOLONE ACETATE 10 MG/ML
SUSPENSION/ DROPS OPHTHALMIC
Qty: 1 BOTTLE | Refills: 0 | Status: SHIPPED | OUTPATIENT
Start: 2018-04-26 | End: 2018-08-09

## 2018-04-26 NOTE — PATIENT INSTRUCTIONS
Recurrent iritis of both eyes  Decrease Pred forte in the right eye to one drop every other week. Decrease Pred forte in the left eye to one drop once a week.   Return in 3-4 months for recheck iritis both eyes, or sooner if he notes a flare up on new fredy

## 2018-04-26 NOTE — PROGRESS NOTES
Sharmila Rudd is a 70year old male. HPI:     HPI     Here for a PO YAG done OD on 4/7/18. Pt states that he has noticed an improvement with his vision OD. Pt still has occasional floaters OD.        Last edited by Bandar Weinstein O.T. on 4/26/2 drop in the left eye 2 times a day for 14 days, then decrease to once a day in the left eye Disp: 1 Bottle Rfl: 0   Linaclotide (LINZESS) 145 MCG Oral Cap Take 1 tablet by mouth daily.  Disp: 90 capsule Rfl: 0   atorvastatin 10 MG Oral Tab Take 1 tablet (10 Right Left    Disc Good rim, Temporal crescent     C/D Ratio 0.2     Macula Normal     Vessels Normal     Periphery Normal             Refraction     Wearing Rx       Sphere Cylinder Axis Add    Right -4.00 +3.00 180 +3.00    Left -3.25 +1.50 170 +3.00

## 2018-04-26 NOTE — PROGRESS NOTES
Tequila Madera is a 70year old male. HPI:     HPI     Here for a recheck iritis OS. Pt has been using Pred Forte OD once a week and OS qhs as directed. Pt denies any pain or redness OU.      Last edited by Keyshawn Banda O.T. on 4/26/2018  1:14 TABLETS BY MOUTH EVERY MORNING Disp: 135 tablet Rfl: 0   Linaclotide (LINZESS) 145 MCG Oral Cap Take 1 tablet by mouth daily. Disp: 90 capsule Rfl: 0   atorvastatin 10 MG Oral Tab Take 1 tablet (10 mg total) by mouth nightly.  Disp: 90 tablet Rfl: 3   Levot Refraction     Wearing Rx       Sphere Cylinder Axis Add    Right -4.00 +3.00 180 +3.00    Left -3.25 +1.50 170 +3.00    Type:  Progressive bifocal                 ASSESSMENT/PLAN:     Diagnoses and Plan:     Recurrent iritis of both eyes  Decrease Pr

## 2018-04-26 NOTE — ASSESSMENT & PLAN NOTE
Decrease Pred forte in the right eye to one drop every other week. Decrease Pred forte in the left eye to one drop once a week. Return in 3-4 months for recheck iritis both eyes, or sooner if he notes a flare up on new regimen.

## 2018-05-04 ENCOUNTER — OFFICE VISIT (OUTPATIENT)
Dept: INTERNAL MEDICINE CLINIC | Facility: CLINIC | Age: 63
End: 2018-05-04

## 2018-05-04 VITALS
SYSTOLIC BLOOD PRESSURE: 133 MMHG | RESPIRATION RATE: 16 BRPM | WEIGHT: 179 LBS | BODY MASS INDEX: 24.24 KG/M2 | HEART RATE: 74 BPM | DIASTOLIC BLOOD PRESSURE: 81 MMHG | HEIGHT: 72 IN

## 2018-05-04 DIAGNOSIS — K21.9 GASTROESOPHAGEAL REFLUX DISEASE WITHOUT ESOPHAGITIS: ICD-10-CM

## 2018-05-04 DIAGNOSIS — I10 ESSENTIAL HYPERTENSION: ICD-10-CM

## 2018-05-04 DIAGNOSIS — F41.9 ANXIETY: ICD-10-CM

## 2018-05-04 DIAGNOSIS — K64.0 GRADE I HEMORRHOIDS: ICD-10-CM

## 2018-05-04 DIAGNOSIS — M25.511 ACUTE PAIN OF RIGHT SHOULDER: ICD-10-CM

## 2018-05-04 DIAGNOSIS — E03.9 ACQUIRED HYPOTHYROIDISM: ICD-10-CM

## 2018-05-04 DIAGNOSIS — K58.2 IRRITABLE BOWEL SYNDROME WITH BOTH CONSTIPATION AND DIARRHEA: ICD-10-CM

## 2018-05-04 DIAGNOSIS — E78.00 HYPERCHOLESTEREMIA: ICD-10-CM

## 2018-05-04 DIAGNOSIS — Z12.11 COLON CANCER SCREENING: ICD-10-CM

## 2018-05-04 DIAGNOSIS — Z00.00 ANNUAL PHYSICAL EXAM: Primary | ICD-10-CM

## 2018-05-04 PROBLEM — Z98.890 POST-OPERATIVE STATE: Status: RESOLVED | Noted: 2018-04-26 | Resolved: 2018-05-04

## 2018-05-04 PROCEDURE — 99213 OFFICE O/P EST LOW 20 MIN: CPT | Performed by: INTERNAL MEDICINE

## 2018-05-04 PROCEDURE — 99397 PER PM REEVAL EST PAT 65+ YR: CPT | Performed by: INTERNAL MEDICINE

## 2018-05-04 PROCEDURE — 99212 OFFICE O/P EST SF 10 MIN: CPT | Performed by: INTERNAL MEDICINE

## 2018-05-04 NOTE — PROGRESS NOTES
Patient ID: Remigio Madrid is a 70year old male. Patient presents with:  Physical       HISTORY OF PRESENT ILLNESS:   HPI  Patient presents for above. Here for annual physical.  History of hypercholesterol on dual therapy.   Recently started his sta Negative. Hematological: Negative. Psychiatric/Behavioral: Negative.       MEDICAL HISTORY:     Past Medical History:   Diagnosis Date   • Acute iritis of both eyes 1/25/2016   • Anxiety state, unspecified    • Cataracts, bilateral    • Congenital leg Tab, Take 1 tablet by mouth daily. , Disp: , Rfl:   •  L-Lysine 1000 MG Oral Tab, Take 1 tablet by mouth 2 (two) times daily. , Disp: , Rfl:   •  Methylcellulose, Laxative, 500 MG Oral Tab, Take 1,000 mg by mouth 2 (two) times daily. , Disp: , Rfl:   •  Omega and no tenderness. Neurological: He is alert and oriented to person, place, and time. Psychiatric: He has a normal mood and affect. ASSESSMENT/PLAN:   1. Annual physical exam  · CBC WITH DIFFERENTIAL WITH PLATELET;  Future  · COMP METABOLIC PAN

## 2018-05-04 NOTE — PATIENT INSTRUCTIONS
Prevention Guidelines, Men Ages 72 and Older  Screening tests and vaccines are an important part of managing your health. Health counseling is essential, too. Below are guidelines for these, for men ages 72 and older.  Talk with your healthcare provider t Prostate cancer All men in this age group, talk to healthcare provider about risks and benefits of digital rectal exam (TERRY) and prostate-specific antigen (PSA) screening1 At routine exams   Syphilis Men at increased risk for infection – talk with your hea Fall prevention (exercise, vitamin D supplements) All men in this age group At routine exams   Sexually transmitted infection Men at increased risk for infection – talk with your healthcare provider At routine exams   Use of daily aspirin Men ages 39 to 78

## 2018-05-07 ENCOUNTER — HOSPITAL ENCOUNTER (OUTPATIENT)
Dept: GENERAL RADIOLOGY | Facility: HOSPITAL | Age: 63
Discharge: HOME | End: 2018-05-07
Attending: INTERNAL MEDICINE
Payer: COMMERCIAL

## 2018-05-07 ENCOUNTER — LAB ENCOUNTER (OUTPATIENT)
Dept: LAB | Facility: HOSPITAL | Age: 63
End: 2018-05-07
Attending: INTERNAL MEDICINE
Payer: COMMERCIAL

## 2018-05-07 DIAGNOSIS — I10 ESSENTIAL HYPERTENSION: ICD-10-CM

## 2018-05-07 DIAGNOSIS — E03.9 ACQUIRED HYPOTHYROIDISM: ICD-10-CM

## 2018-05-07 DIAGNOSIS — Z00.00 ANNUAL PHYSICAL EXAM: ICD-10-CM

## 2018-05-07 DIAGNOSIS — M25.511 ACUTE PAIN OF RIGHT SHOULDER: ICD-10-CM

## 2018-05-07 DIAGNOSIS — E78.00 HYPERCHOLESTEREMIA: ICD-10-CM

## 2018-05-07 PROCEDURE — 84443 ASSAY THYROID STIM HORMONE: CPT

## 2018-05-07 PROCEDURE — 80061 LIPID PANEL: CPT

## 2018-05-07 PROCEDURE — 73030 X-RAY EXAM OF SHOULDER: CPT | Performed by: INTERNAL MEDICINE

## 2018-05-07 PROCEDURE — 82570 ASSAY OF URINE CREATININE: CPT

## 2018-05-07 PROCEDURE — 36415 COLL VENOUS BLD VENIPUNCTURE: CPT

## 2018-05-07 PROCEDURE — 85025 COMPLETE CBC W/AUTO DIFF WBC: CPT

## 2018-05-07 PROCEDURE — 84153 ASSAY OF PSA TOTAL: CPT

## 2018-05-07 PROCEDURE — 82043 UR ALBUMIN QUANTITATIVE: CPT

## 2018-05-07 PROCEDURE — 80053 COMPREHEN METABOLIC PANEL: CPT

## 2018-06-02 DIAGNOSIS — E03.8 OTHER SPECIFIED HYPOTHYROIDISM: ICD-10-CM

## 2018-06-04 RX ORDER — LEVOTHYROXINE SODIUM 0.1 MG/1
TABLET ORAL
Qty: 90 TABLET | Refills: 0 | Status: SHIPPED | OUTPATIENT
Start: 2018-06-04 | End: 2018-09-01

## 2018-06-04 NOTE — TELEPHONE ENCOUNTER
Hypothyroid Medications  Protocol Criteria:  Appointment scheduled in the past 12 months or the next 3 months  TSH resulted in the past 12 months that is normal  Recent Outpatient Visits            1 month ago Annual physical exam    [address]

## 2018-07-06 DIAGNOSIS — F32.89 OTHER DEPRESSION: ICD-10-CM

## 2018-07-06 RX ORDER — PAROXETINE HYDROCHLORIDE 20 MG/1
TABLET, FILM COATED ORAL
Qty: 135 TABLET | Refills: 0 | Status: SHIPPED | OUTPATIENT
Start: 2018-07-06 | End: 2018-10-02

## 2018-07-06 NOTE — TELEPHONE ENCOUNTER
Refilled per protocol #90, LOV 5/4/18      PAROXETINE 20MG TABLETS  Will file in chart as: PAROXETINE HCL 20 MG Oral Tab  TAKE 1 AND 1/2 TABLETS BY MOUTH EVERY MORNING  Disp: 135 tablet Refills: 0    Class: Normal Start: 7/6/2018   For: Other depression  D

## 2018-07-10 ENCOUNTER — APPOINTMENT (OUTPATIENT)
Dept: LAB | Facility: HOSPITAL | Age: 63
End: 2018-07-10
Attending: INTERNAL MEDICINE
Payer: COMMERCIAL

## 2018-07-10 DIAGNOSIS — Z12.11 COLON CANCER SCREENING: ICD-10-CM

## 2018-07-10 LAB — HEMOCCULT STL QL: NEGATIVE

## 2018-07-10 PROCEDURE — 82274 ASSAY TEST FOR BLOOD FECAL: CPT

## 2018-07-16 DIAGNOSIS — K21.9 GASTROESOPHAGEAL REFLUX DISEASE, ESOPHAGITIS PRESENCE NOT SPECIFIED: ICD-10-CM

## 2018-07-16 DIAGNOSIS — I10 ESSENTIAL HYPERTENSION WITH GOAL BLOOD PRESSURE LESS THAN 130/85: ICD-10-CM

## 2018-07-17 RX ORDER — OMEPRAZOLE 20 MG/1
CAPSULE, DELAYED RELEASE ORAL
Qty: 90 CAPSULE | Refills: 0 | Status: SHIPPED | OUTPATIENT
Start: 2018-07-17 | End: 2018-10-14

## 2018-07-17 RX ORDER — AMLODIPINE BESYLATE 10 MG/1
TABLET ORAL
Qty: 90 TABLET | Refills: 0 | Status: SHIPPED | OUTPATIENT
Start: 2018-07-17 | End: 2018-10-14

## 2018-08-09 ENCOUNTER — OFFICE VISIT (OUTPATIENT)
Dept: OPHTHALMOLOGY | Facility: CLINIC | Age: 63
End: 2018-08-09
Payer: COMMERCIAL

## 2018-08-09 DIAGNOSIS — H20.023 RECURRENT IRITIS OF BOTH EYES: Primary | ICD-10-CM

## 2018-08-09 PROCEDURE — 99213 OFFICE O/P EST LOW 20 MIN: CPT | Performed by: OPHTHALMOLOGY

## 2018-08-09 PROCEDURE — 99212 OFFICE O/P EST SF 10 MIN: CPT | Performed by: OPHTHALMOLOGY

## 2018-08-09 RX ORDER — PREDNISOLONE ACETATE 10 MG/ML
SUSPENSION/ DROPS OPHTHALMIC
Qty: 1 BOTTLE | Refills: 0 | Status: SHIPPED | OUTPATIENT
Start: 2018-08-09 | End: 2018-11-28

## 2018-08-09 NOTE — ASSESSMENT & PLAN NOTE
Left eye iritis has flared up. He has been using Pred forte 3 times a day in the left eye for the past 2 weeks.     Use Pred forte 2 times a day in the left eye for 1 week, then once a day for 1 week, and then if symptoms are better in the left eye, then g

## 2018-08-09 NOTE — PATIENT INSTRUCTIONS
Recurrent iritis of both eyes  Left eye iritis has flared up. He has been using Pred forte 3 times a day in the left eye for the past 2 weeks.     Use Pred forte 2 times a day in the left eye for 1 week, then once a day for 1 week, and then if symptoms are

## 2018-08-09 NOTE — PROGRESS NOTES
Iain Montes is a 70year old male. HPI:     HPI     Here for a recheck of iritis OU. Pt states that 2 weeks ago he had pain 3/10  and pressure above both eyes.  Pt started using Pred Forte 3 times a day OS only 2 weeks ago and currently he is stil or as directed.  Disp: 1 Bottle Rfl: 0   OMEPRAZOLE 20 MG Oral Capsule Delayed Release TAKE 1 CAPSULE(20 MG) BY MOUTH EVERY MORNING Disp: 90 capsule Rfl: 0   AMLODIPINE BESYLATE 10 MG Oral Tab TAKE 1 TABLET(10 MG) BY MOUTH DAILY Disp: 90 tablet Rfl: 0   PAR dysfunction    Conjunctiva/Sclera Temp pinguecula, Non-injected Nasal/temp pinguecula, 1+ injection     Cornea no KP, trace Guttata no KP , Trace Guttata    Anterior Chamber Deep and one cell  deep and rare cell     Iris Normal Normal    Lens PC IOL with Y

## 2018-08-10 ENCOUNTER — OFFICE VISIT (OUTPATIENT)
Dept: INTERNAL MEDICINE CLINIC | Facility: CLINIC | Age: 63
End: 2018-08-10
Payer: COMMERCIAL

## 2018-08-10 VITALS
BODY MASS INDEX: 24.79 KG/M2 | TEMPERATURE: 98 F | HEART RATE: 63 BPM | WEIGHT: 183 LBS | HEIGHT: 72 IN | DIASTOLIC BLOOD PRESSURE: 84 MMHG | SYSTOLIC BLOOD PRESSURE: 138 MMHG

## 2018-08-10 DIAGNOSIS — Q38.3 TONGUE ANOMALY: ICD-10-CM

## 2018-08-10 DIAGNOSIS — I10 ESSENTIAL HYPERTENSION: Primary | ICD-10-CM

## 2018-08-10 DIAGNOSIS — E78.00 HYPERCHOLESTEREMIA: ICD-10-CM

## 2018-08-10 PROCEDURE — 99212 OFFICE O/P EST SF 10 MIN: CPT | Performed by: INTERNAL MEDICINE

## 2018-08-10 PROCEDURE — 99214 OFFICE O/P EST MOD 30 MIN: CPT | Performed by: INTERNAL MEDICINE

## 2018-08-10 NOTE — PATIENT INSTRUCTIONS
Lifestyle Changes to Control Cholesterol  You can control your cholesterol through diet, exercise, weight management, quitting smoking, stress management, and taking your medicines right. These things can also lower your risk for cardiovascular disease. · Riding a bicycle or stationary bike  · Dancing  Managing your weight  If you are overweight or obese, your healthcare provider will work with you to help you lose weight and lower your BMI (body mass index).  Making diet changes and getting more physical · Don’t skip a dose or stop taking your medicine because you feel better or because your cholesterol numbers go down. Never stop taking your medicine unless your healthcare provider has told you it’s OK.   · Ask your healthcare provider if you have any ques © 4034-5934 [employer] [address]. [address] [facility], [address] [place]. All rights reserved. This information is not intended as a substitute for professional medical care. Always follow your healthcare professional's instructions.         Low-Sal Ok: Ice cream, frozen yogurt, juice bars, gelatin, cookies and pies, sugar, honey, jelly, hard candy  Avoid: Most pies, cakes and cookies prepared or processed with salt; instant pudding  Drinks  Ok: Tea, coffee, fizzy (carbonated) drinks, juices  Avoid: F

## 2018-08-10 NOTE — PROGRESS NOTES
Patient ID: Jeannie Gonzalez is a 70year old male. Patient presents with:  Blood Pressure: 3 month f/u  Blisters: on tongue noticed 3 weeks ago       HISTORY OF PRESENT ILLNESS:   HPI  Patient presents for above.   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

## 2018-09-01 DIAGNOSIS — E03.8 OTHER SPECIFIED HYPOTHYROIDISM: ICD-10-CM

## 2018-09-01 RX ORDER — LEVOTHYROXINE SODIUM 0.1 MG/1
TABLET ORAL
Qty: 90 TABLET | Refills: 0 | Status: SHIPPED | OUTPATIENT
Start: 2018-09-01 | End: 2018-12-02

## 2018-09-01 NOTE — TELEPHONE ENCOUNTER
Hypothyroid Medications  Protocol Criteria:  Appointment scheduled in the past 12 months or the next 3 months  TSH resulted in the past 12 months that is normal  Recent Outpatient Visits            3 weeks ago Essential hypertension    [address], [place]

## 2018-10-02 DIAGNOSIS — F32.89 OTHER DEPRESSION: ICD-10-CM

## 2018-10-04 RX ORDER — PAROXETINE HYDROCHLORIDE 20 MG/1
TABLET, FILM COATED ORAL
Qty: 135 TABLET | Refills: 0 | Status: SHIPPED | OUTPATIENT
Start: 2018-10-04 | End: 2019-01-05

## 2018-10-05 NOTE — TELEPHONE ENCOUNTER
Refilled per protocol.     Refill Protocol Appointment Criteria  · Appointment scheduled in the past 6 months or in the next 3 months  Recent Outpatient Visits            1 month ago Essential hypertension    [facility], [address] [place] [address]

## 2018-10-14 DIAGNOSIS — I10 ESSENTIAL HYPERTENSION WITH GOAL BLOOD PRESSURE LESS THAN 130/85: ICD-10-CM

## 2018-10-14 DIAGNOSIS — K21.9 GASTROESOPHAGEAL REFLUX DISEASE, ESOPHAGITIS PRESENCE NOT SPECIFIED: ICD-10-CM

## 2018-10-15 RX ORDER — AMLODIPINE BESYLATE 10 MG/1
TABLET ORAL
Qty: 90 TABLET | Refills: 0 | Status: SHIPPED | OUTPATIENT
Start: 2018-10-15 | End: 2019-01-18

## 2018-10-15 RX ORDER — OMEPRAZOLE 20 MG/1
CAPSULE, DELAYED RELEASE ORAL
Qty: 90 CAPSULE | Refills: 0 | Status: SHIPPED | OUTPATIENT
Start: 2018-10-15 | End: 2019-01-18

## 2018-10-15 NOTE — TELEPHONE ENCOUNTER
Hypertensive Medications  Protocol Criteria:  · Appointment scheduled in the past 6 months or in the next 3 months  · BMP or CMP in the past 12 months  · Creatinine result < 2  Recent Outpatient Visits            2 months ago Essential hypertension    [facility] Ophthalmology Deshaun Garner MD    Office Visit    5 months ago Annual physical exam    Sanford Byrne MD    Office Visit    5 months ago Recurrent iritis of both eyes    [facility]

## 2018-10-27 DIAGNOSIS — E78.00 HYPERCHOLESTEREMIA: ICD-10-CM

## 2018-10-27 RX ORDER — ATORVASTATIN CALCIUM 10 MG/1
TABLET, FILM COATED ORAL
Qty: 90 TABLET | Refills: 0 | Status: SHIPPED | OUTPATIENT
Start: 2018-10-27 | End: 2019-01-26

## 2018-11-28 ENCOUNTER — OFFICE VISIT (OUTPATIENT)
Dept: OPHTHALMOLOGY | Facility: CLINIC | Age: 63
End: 2018-11-28
Payer: COMMERCIAL

## 2018-11-28 ENCOUNTER — TELEPHONE (OUTPATIENT)
Dept: OPHTHALMOLOGY | Facility: CLINIC | Age: 63
End: 2018-11-28

## 2018-11-28 DIAGNOSIS — H20.13 CHRONIC IRITIS OF BOTH EYES: Primary | ICD-10-CM

## 2018-11-28 DIAGNOSIS — R76.8 POSITIVE ANA (ANTINUCLEAR ANTIBODY): ICD-10-CM

## 2018-11-28 PROCEDURE — 99212 OFFICE O/P EST SF 10 MIN: CPT | Performed by: OPHTHALMOLOGY

## 2018-11-28 PROCEDURE — 99213 OFFICE O/P EST LOW 20 MIN: CPT | Performed by: OPHTHALMOLOGY

## 2018-11-28 RX ORDER — PREDNISOLONE ACETATE 10 MG/ML
SUSPENSION/ DROPS OPHTHALMIC
Qty: 1 BOTTLE | Refills: 0 | Status: SHIPPED | OUTPATIENT
Start: 2018-11-28 | End: 2019-01-23

## 2018-11-28 NOTE — PROGRESS NOTES
Kang Munoz is a 70year old male. HPI:     HPI     Pt called today complaining of redness temporally and pain 5/10 OD for 7 days.     Hx of chronic iritis OU He was last seen in August he was instructed to use Pred Forte once every other week in TABLET(10 MG) BY MOUTH EVERY NIGHT Disp: 90 tablet Rfl: 0   OMEPRAZOLE 20 MG Oral Capsule Delayed Release TAKE 1 CAPSULE(20 MG) BY MOUTH EVERY MORNING Disp: 90 capsule Rfl: 0   AMLODIPINE BESYLATE 10 MG Oral Tab TAKE 1 TABLET(10 MG) BY MOUTH DAILY Disp: 90 Nasal/temp pinguecula, Non-injected    Cornea no KP, trace Guttata no KP , Trace Guttata    Anterior Chamber Deep and rare cell  deep and quiet     Iris Normal Normal    Lens PC IOL with YAG      Vitreous Clear             Refraction     Wearing Rx       S

## 2018-11-28 NOTE — PATIENT INSTRUCTIONS
Chronic iritis of both eyes  Patient has episcleritis in the right eye today. Increased Pred forte in the right eye to 4 times a day until return visit next week. Take Ibuprofen - 200 mg (2-3 tablets twice a day) as directed.       Left eye is stable;

## 2018-11-28 NOTE — TELEPHONE ENCOUNTER
Patient states he believes he has iritis again in his right eye. States there is a bump in his eye. Requesting to be seen soon. No availability. Please call. Thank you.

## 2018-11-28 NOTE — ASSESSMENT & PLAN NOTE
Patient has episcleritis in the right eye today. Increased Pred forte in the right eye to 4 times a day until return visit next week. Take Ibuprofen - 200 mg (2-3 tablets twice a day) as directed.       Left eye is stable; reduce Pred forte in the left

## 2018-12-02 DIAGNOSIS — E03.8 OTHER SPECIFIED HYPOTHYROIDISM: ICD-10-CM

## 2018-12-03 RX ORDER — LEVOTHYROXINE SODIUM 0.1 MG/1
TABLET ORAL
Qty: 90 TABLET | Refills: 0 | Status: SHIPPED | OUTPATIENT
Start: 2018-12-03 | End: 2019-02-25

## 2018-12-03 NOTE — TELEPHONE ENCOUNTER
Refill passed per [facility] protocol.     Hypothyroid Medications  Protocol Criteria:  Appointment scheduled in the past 12 months or the next 3 months  TSH resulted in the past 12 months that is normal  Recent Outpatient Visits            5 days ago Chronic iritis of both eyes    [facility] Kristen Owusu MD    Office Visit    3 months ago Essential hypertension    [facility], [address] MD    Office Visit    3 months ago Recurrent iritis of both eyes    [facility] Kristen Owusu MD    Office Visit    7 months ago Annual physical exam    [facility], [address] MD    Office Visit    7 months ago Recurrent iritis of both eyes    [facility] Kristen Owusu MD    Office Visit        Future Appointments       Provider Department Appt Notes    In 3 days Kristen Owusu MD [facility] R/C of episcleritis OD     In 4 months Kristen Owusu MD [facility] EP EE    In 5 months Caprice Smith MD [facility], [address] Complete Bread        Lab Results   Component Value Date    TSH 1.98 05/07/2018

## 2018-12-06 ENCOUNTER — OFFICE VISIT (OUTPATIENT)
Dept: OPHTHALMOLOGY | Facility: CLINIC | Age: 63
End: 2018-12-06
Payer: COMMERCIAL

## 2018-12-06 DIAGNOSIS — H20.13 CHRONIC IRITIS OF BOTH EYES: Primary | ICD-10-CM

## 2018-12-06 PROCEDURE — 99213 OFFICE O/P EST LOW 20 MIN: CPT | Performed by: OPHTHALMOLOGY

## 2018-12-06 PROCEDURE — 99212 OFFICE O/P EST SF 10 MIN: CPT | Performed by: OPHTHALMOLOGY

## 2018-12-06 NOTE — PROGRESS NOTES
Geraldine Griffith is a 70year old male. HPI:     HPI     Patient is here for a 1 week recheck of episcleritis OD. He was seen on 11/28/18 and was told to use Pred forte qid OD and to use 1 drop of Pred forte once every 2 weeks in the left eye.    Marta Suspension Use 1 drop in the right eye 4 times a day and use 1 drop in the left eye every other week Disp: 1 Bottle Rfl: 0   ATORVASTATIN 10 MG Oral Tab TAKE 1 TABLET(10 MG) BY MOUTH EVERY NIGHT Disp: 90 tablet Rfl: 0   OMEPRAZOLE 20 MG Oral Capsule Delaye pinguecula, trace injection.  Mild redness, swelling superotemporal bulbar conjunctiva Temp pinguecula, Non-injected    Cornea no KP, trace Guttata Clear    Anterior Chamber Deep and rare cell  deep and quiet     Iris Normal Normal    Lens PC IOL with YAG

## 2018-12-06 NOTE — ASSESSMENT & PLAN NOTE
Improved-  Use Pred forte in the right eye to 4 times a day x 1 week, 3 times a day for 2 weeks, 2 times a day for 2 wees and 1x a day for 2 weeks and return back in 8 weeks. Take Ibuprofen - as needed. Left eye is stable; Stop Pred Forte completely.

## 2018-12-06 NOTE — PATIENT INSTRUCTIONS
Chronic iritis of both eyes  Improved-  Use Pred forte in the right eye to 4 times a day x 1 week, 3 times a day for 2 weeks, 2 times a day for 2 wees and 1x a day for 2 weeks and return back in 8 weeks. Take Ibuprofen - as needed.      Left eye is stable

## 2019-01-05 DIAGNOSIS — F32.89 OTHER DEPRESSION: ICD-10-CM

## 2019-01-07 RX ORDER — PAROXETINE HYDROCHLORIDE 20 MG/1
TABLET, FILM COATED ORAL
Qty: 135 TABLET | Refills: 0 | Status: SHIPPED | OUTPATIENT
Start: 2019-01-07 | End: 2019-04-07

## 2019-01-14 RX ORDER — PREDNISOLONE ACETATE 10 MG/ML
SUSPENSION/ DROPS OPHTHALMIC
Qty: 1 BOTTLE | Refills: 1 | Status: SHIPPED | OUTPATIENT
Start: 2019-01-14 | End: 2019-04-10

## 2019-01-14 NOTE — TELEPHONE ENCOUNTER
Pt came to  [address] to request refill of RX Prednisolone Acetate from Dr Louis Solis. Please let pt know when ready.  Thank you

## 2019-01-14 NOTE — TELEPHONE ENCOUNTER
Patient is compliant. Routed to Dr. Patricio Carreno to approve for Dr. Jo Ann Moreno. He has a follow up with Dr. Jo Ann Moreno on 1/23/19.

## 2019-01-18 DIAGNOSIS — K21.9 GASTROESOPHAGEAL REFLUX DISEASE, ESOPHAGITIS PRESENCE NOT SPECIFIED: ICD-10-CM

## 2019-01-18 DIAGNOSIS — I10 ESSENTIAL HYPERTENSION WITH GOAL BLOOD PRESSURE LESS THAN 130/85: ICD-10-CM

## 2019-01-19 RX ORDER — AMLODIPINE BESYLATE 10 MG/1
TABLET ORAL
Qty: 90 TABLET | Refills: 0 | Status: SHIPPED | OUTPATIENT
Start: 2019-01-19 | End: 2019-04-15

## 2019-01-19 RX ORDER — OMEPRAZOLE 20 MG/1
CAPSULE, DELAYED RELEASE ORAL
Qty: 90 CAPSULE | Refills: 0 | Status: SHIPPED | OUTPATIENT
Start: 2019-01-19 | End: 2019-04-15

## 2019-01-23 ENCOUNTER — OFFICE VISIT (OUTPATIENT)
Dept: OPHTHALMOLOGY | Facility: CLINIC | Age: 64
End: 2019-01-23
Payer: COMMERCIAL

## 2019-01-23 DIAGNOSIS — H15.102 EPISCLERITIS OF LEFT EYE: Primary | ICD-10-CM

## 2019-01-23 PROCEDURE — 99213 OFFICE O/P EST LOW 20 MIN: CPT | Performed by: OPHTHALMOLOGY

## 2019-01-23 PROCEDURE — 99212 OFFICE O/P EST SF 10 MIN: CPT | Performed by: OPHTHALMOLOGY

## 2019-01-23 RX ORDER — PREDNISOLONE ACETATE 10 MG/ML
SUSPENSION/ DROPS OPHTHALMIC
Qty: 1 BOTTLE | Refills: 0 | Status: SHIPPED | OUTPATIENT
Start: 2019-01-23 | End: 2019-04-10

## 2019-01-23 NOTE — ASSESSMENT & PLAN NOTE
Use Pred Forte 3x a day in the left eye x 1 week, 2x a day in the left eye x 1 week then once a day in the last eye x 1 week and discontinue.    Return in April for dilated eye exam.

## 2019-01-23 NOTE — PROGRESS NOTES
Darek Stacy is a 70year old male. HPI:     HPI     Patient is her for a recheck of episcleritis OD.   Patient was last seen on 12/6/18 and was told to wean off of Pred forte in the right eye- (2 weeks at a time) and to stop Pred forte in the left Never Used    Alcohol use: Yes      Comment: beer occasionally    Drug use: No      Medications:    Current Outpatient Medications:  AMLODIPINE BESYLATE 10 MG Oral Tab TAKE 1 TABLET(10 MG) BY MOUTH DAILY Disp: 90 tablet Rfl: 0   OMEPRAZOLE 20 MG Oral Capsu Acuity (Snellen - Linear)       Right Left    Dist cc 20/20- blurry 20/20          Tonometry (Non-contact air puff, 1:44 PM)       Right Left    Pressure 23 20            Slit Lamp and Fundus Exam     Slit Lamp Exam       Right Left    Lids/Lashes Dermatoc

## 2019-01-23 NOTE — PATIENT INSTRUCTIONS
Episcleritis of left eye  Use Pred Forte 3x a day in the left eye x 1 week, 2x a day in the left eye x 1 week then once a day in the last eye x 1 week and discontinue.    Return in April for dilated eye exam.

## 2019-01-26 DIAGNOSIS — E78.00 HYPERCHOLESTEREMIA: ICD-10-CM

## 2019-01-26 RX ORDER — ATORVASTATIN CALCIUM 10 MG/1
TABLET, FILM COATED ORAL
Qty: 90 TABLET | Refills: 0 | Status: SHIPPED | OUTPATIENT
Start: 2019-01-26 | End: 2019-04-27

## 2019-02-25 DIAGNOSIS — E03.8 OTHER SPECIFIED HYPOTHYROIDISM: ICD-10-CM

## 2019-02-26 RX ORDER — LEVOTHYROXINE SODIUM 0.1 MG/1
TABLET ORAL
Qty: 90 TABLET | Refills: 0 | Status: SHIPPED | OUTPATIENT
Start: 2019-02-26 | End: 2019-04-02

## 2019-02-26 NOTE — TELEPHONE ENCOUNTER
Refill passed per [facility], [facility] protocol.     Hypothyroid Medications  Protocol Criteria:  Appointment scheduled in the past 12 months or the next 3 months  TSH resulted in the past 12 months that is normal  Recent Outpatient Visits            1 month ago

## 2019-04-02 DIAGNOSIS — E03.8 OTHER SPECIFIED HYPOTHYROIDISM: ICD-10-CM

## 2019-04-03 RX ORDER — LEVOTHYROXINE SODIUM 0.1 MG/1
TABLET ORAL
Qty: 90 TABLET | Refills: 0 | Status: SHIPPED | OUTPATIENT
Start: 2019-04-03 | End: 2019-08-25

## 2019-04-03 NOTE — TELEPHONE ENCOUNTER
Refill passed per [facility], [facility] protocol.   Hypothyroid Medications  Protocol Criteria:  Appointment scheduled in the past 12 months or the next 3 months  TSH resulted in the past 12 months that is normal  Recent Outpatient Visits            2 months ago Episcleritis of left eye    [facility] Juan Mccullough MD    Office Visit    3 months ago Chronic iritis of both eyes    [facility] Juan Mccullough MD    Office Visit    4 months ago Chronic iritis of both eyes    [facility] Juan Mccullough MD    Office Visit    7 months ago Essential hypertension    [facility], [facility], [address] MD    Office Visit    7 months ago Recurrent iritis of both eyes    [facility] Juan Mccullough MD    Office Visit        Future Appointments       Provider Department Appt Notes    In 1 week Juan Mccullough MD [facility] Ophthalmology EP EE    In 1 month Gavin Stanley MD [facility], [facility], [facility] Complete PX        Lab Results   Component Value Date    TSH 1.98 05/07/2018

## 2019-04-07 DIAGNOSIS — F32.89 OTHER DEPRESSION: ICD-10-CM

## 2019-04-08 RX ORDER — PAROXETINE HYDROCHLORIDE 20 MG/1
TABLET, FILM COATED ORAL
Qty: 135 TABLET | Refills: 0 | Status: SHIPPED | OUTPATIENT
Start: 2019-04-08 | End: 2019-07-05

## 2019-04-10 ENCOUNTER — OFFICE VISIT (OUTPATIENT)
Dept: OPHTHALMOLOGY | Facility: CLINIC | Age: 64
End: 2019-04-10
Payer: COMMERCIAL

## 2019-04-10 DIAGNOSIS — H25.12 AGE-RELATED NUCLEAR CATARACT OF LEFT EYE: ICD-10-CM

## 2019-04-10 DIAGNOSIS — Z96.1 PSEUDOPHAKIA, RIGHT EYE: ICD-10-CM

## 2019-04-10 DIAGNOSIS — H43.393 FLOATER, VITREOUS, BILATERAL: ICD-10-CM

## 2019-04-10 DIAGNOSIS — H20.022 RECURRENT IRITIS, LEFT: Primary | ICD-10-CM

## 2019-04-10 PROCEDURE — 92015 DETERMINE REFRACTIVE STATE: CPT | Performed by: OPHTHALMOLOGY

## 2019-04-10 PROCEDURE — 92014 COMPRE OPH EXAM EST PT 1/>: CPT | Performed by: OPHTHALMOLOGY

## 2019-04-10 RX ORDER — PREDNISOLONE ACETATE 10 MG/ML
SUSPENSION/ DROPS OPHTHALMIC
Qty: 1 BOTTLE | Refills: 1 | Status: SHIPPED | OUTPATIENT
Start: 2019-04-10 | End: 2019-04-25

## 2019-04-10 NOTE — ASSESSMENT & PLAN NOTE
Recurrence of iritis in the left eye. Start Pred forte in the left eye;  Use 1 drop 3 times a day for 5 days, then 2 times a day for 5 days, then once a day for 5 days, then discontinue.    Patient should call office and make return appointment if symptoms

## 2019-04-10 NOTE — PATIENT INSTRUCTIONS
Recurrent iritis, left  Recurrence of iritis in the left eye. Start Pred forte in the left eye;  Use 1 drop 3 times a day for 5 days, then 2 times a day for 5 days, then once a day for 5 days, then discontinue.    Patient should call office and make return

## 2019-04-10 NOTE — PROGRESS NOTES
Liz Nelson is a 75year old male. HPI:     HPI     Patient is here for a complete exam. Patient was last seen by SALLY on 1/23/19 for episcleritis OS. Patient states his wife says OS is red but he has not had any pain irritation OS.  Patient would SODIUM 100 MCG Oral Tab TAKE 1 TABLET BY MOUTH BEFORE BREAKFAST Disp: 90 tablet Rfl: 0   ATORVASTATIN 10 MG Oral Tab TAKE 1 TABLET(10 MG) BY MOUTH EVERY NIGHT Disp: 90 tablet Rfl: 0   AMLODIPINE BESYLATE 10 MG Oral Tab TAKE 1 TABLET(10 MG) BY MOUTH DAILY D dysfunction Dermatochalasis, Meibomian gland dysfunction     Conjunctiva/Sclera Temp pinguecula, Trace Injection Temp pinguecula, 1+ Injection temporally     Cornea no KP, trace Guttata  trace Guttata    Anterior Chamber Deep and rare cell  deep and 1+ marium defined types were placed in this encounter.       Meds This Visit:  Requested Prescriptions     Signed Prescriptions Disp Refills   • prednisoLONE acetate (PRED FORTE) 1 % Ophthalmic Suspension 1 Bottle 1     Sig: Use 1 drop in left eye 3 times a day for 5

## 2019-04-15 DIAGNOSIS — I10 ESSENTIAL HYPERTENSION WITH GOAL BLOOD PRESSURE LESS THAN 130/85: ICD-10-CM

## 2019-04-15 DIAGNOSIS — K21.9 GASTROESOPHAGEAL REFLUX DISEASE, ESOPHAGITIS PRESENCE NOT SPECIFIED: ICD-10-CM

## 2019-04-18 RX ORDER — AMLODIPINE BESYLATE 10 MG/1
TABLET ORAL
Qty: 90 TABLET | Refills: 1 | Status: SHIPPED | OUTPATIENT
Start: 2019-04-18 | End: 2019-10-13

## 2019-04-18 RX ORDER — OMEPRAZOLE 20 MG/1
CAPSULE, DELAYED RELEASE ORAL
Qty: 90 CAPSULE | Refills: 1 | Status: SHIPPED | OUTPATIENT
Start: 2019-04-18 | End: 2019-10-05

## 2019-04-18 NOTE — TELEPHONE ENCOUNTER
Refilled per protocol.   Hypertensive Medications  Protocol Criteria:  · Appointment scheduled in the past 6 months or in the next 3 months  · BMP or CMP in the past 12 months  · Creatinine result < 2  Recent Outpatient Visits            1 week ago Recurren Office Visit    4 months ago Chronic iritis of both eyes    [facility] Diamond Rice MD    Office Visit    4 months ago Chronic iritis of both eyes    [facility] Diamond Rice

## 2019-04-20 ENCOUNTER — HOSPITAL ENCOUNTER (EMERGENCY)
Facility: HOSPITAL | Age: 64
Discharge: HOME | End: 2019-04-20
Attending: EMERGENCY MEDICINE
Payer: COMMERCIAL

## 2019-04-20 VITALS
HEART RATE: 95 BPM | DIASTOLIC BLOOD PRESSURE: 98 MMHG | TEMPERATURE: 98 F | RESPIRATION RATE: 16 BRPM | OXYGEN SATURATION: 96 % | SYSTOLIC BLOOD PRESSURE: 145 MMHG

## 2019-04-20 DIAGNOSIS — L50.0 ALLERGIC URTICARIA: Primary | ICD-10-CM

## 2019-04-20 PROCEDURE — 99283 EMERGENCY DEPT VISIT LOW MDM: CPT

## 2019-04-20 RX ORDER — PREDNISONE 20 MG/1
60 TABLET ORAL DAILY
Qty: 15 TABLET | Refills: 0 | Status: SHIPPED | OUTPATIENT
Start: 2019-04-20 | End: 2019-04-25

## 2019-04-20 RX ORDER — FAMOTIDINE 20 MG/1
20 TABLET ORAL 2 TIMES DAILY PRN
Qty: 10 TABLET | Refills: 0 | Status: SHIPPED | OUTPATIENT
Start: 2019-04-20 | End: 2019-04-25

## 2019-04-20 RX ORDER — LORATADINE 10 MG/1
10 TABLET ORAL DAILY
Qty: 10 TABLET | Refills: 0 | Status: SHIPPED | OUTPATIENT
Start: 2019-04-20 | End: 2019-04-30

## 2019-04-20 NOTE — ED PROVIDER NOTES
Patient Seen in: [facility] AND [facility] Emergency Department    History   Patient presents with:  Rash Skin Problem (integumentary)    Stated Complaint: Rash    HPI    56-year-old male patient presents complaining of a rash that has been coming on and off for (Oral)   Resp 16   SpO2 96%         Physical Exam    Gen: Awake alert in no apparent distress  HEENT: Anicteric, EOMI, PERRL, clear oropharynx. Unremarkable tongue. Heart: Regular rate and rhythm, no murmur  Lungs: Normal respiratory effort, clear lungs. days., Print Script, Disp-15 tablet, R-0    loratadine 10 MG Oral Tab  Take 1 tablet (10 mg total) by mouth daily for 10 days. , Print Script, Disp-10 tablet, R-0

## 2019-04-22 ENCOUNTER — OFFICE VISIT (OUTPATIENT)
Dept: DERMATOLOGY CLINIC | Facility: CLINIC | Age: 64
End: 2019-04-22
Payer: COMMERCIAL

## 2019-04-22 DIAGNOSIS — L50.0 ALLERGIC URTICARIA: Primary | ICD-10-CM

## 2019-04-22 DIAGNOSIS — D23.9 BENIGN NEOPLASM OF SKIN, UNSPECIFIED LOCATION: ICD-10-CM

## 2019-04-22 PROCEDURE — 99212 OFFICE O/P EST SF 10 MIN: CPT | Performed by: DERMATOLOGY

## 2019-04-22 PROCEDURE — 99213 OFFICE O/P EST LOW 20 MIN: CPT | Performed by: DERMATOLOGY

## 2019-04-27 DIAGNOSIS — E78.00 HYPERCHOLESTEREMIA: ICD-10-CM

## 2019-04-27 RX ORDER — ATORVASTATIN CALCIUM 10 MG/1
TABLET, FILM COATED ORAL
Qty: 90 TABLET | Refills: 1 | Status: SHIPPED | OUTPATIENT
Start: 2019-04-27 | End: 2019-10-28

## 2019-05-05 NOTE — PROGRESS NOTES
Mark Bartlett is a 75year old male. Patient presents with:  Derm Problem: LOV 6/30/17. pt presenting today with itchy patches to upper body for 3 days. pt went to er and prescribed prednisone 20mg. pt states some improvement.             Dom Zhong Social History:  Social History    Tobacco Use      Smoking status: Former Smoker        Quit date: 1983        Years since quittin.3      Smokeless tobacco: Never Used    Alcohol use: Yes      Comment: beer occasionally    Drug use:  No Recurrent iritis of both eyes 1/25/2016   • Thyroid disease    • Unspecified essential hypertension      Past Surgical History:   Procedure Laterality Date   • CATARACT EXTRACTION EXTRACAPSULAR W/ INTRAOCULAR LENS IMPLANTATION Right 09/26/2017    Dr. Víctor Hoover @ Not on file        Forced sexual activity: Not on file    Other Topics      Concerns:         Service: Not Asked        Blood Transfusions: Not Asked        Caffeine Concern: Yes          coffee, 2 cups        Occupational Exposure: Not Asked essential oil diffuser had been using lavender oil consistently without problems his wife had added eucalyptus oil recently. Had used this both evenings prior. After noting this has not used the eucalyptus oil and has not had recurrence of symptoms.   No various products including moisturizers, creams soaps cleansers detergent etc. reviewed with patient. Potential allergen, irritants reviewed as well. Pathophysiology reviewed. .  Patient will let us know how they are doing over the next several weeks.

## 2019-05-17 ENCOUNTER — OFFICE VISIT (OUTPATIENT)
Dept: INTERNAL MEDICINE CLINIC | Facility: CLINIC | Age: 64
End: 2019-05-17
Payer: COMMERCIAL

## 2019-05-17 ENCOUNTER — LAB ENCOUNTER (OUTPATIENT)
Dept: LAB | Age: 64
End: 2019-05-17
Attending: INTERNAL MEDICINE
Payer: COMMERCIAL

## 2019-05-17 VITALS
HEART RATE: 88 BPM | DIASTOLIC BLOOD PRESSURE: 65 MMHG | HEIGHT: 72 IN | SYSTOLIC BLOOD PRESSURE: 102 MMHG | WEIGHT: 181.63 LBS | BODY MASS INDEX: 24.6 KG/M2

## 2019-05-17 DIAGNOSIS — E78.00 HYPERCHOLESTEREMIA: ICD-10-CM

## 2019-05-17 DIAGNOSIS — E03.9 ACQUIRED HYPOTHYROIDISM: ICD-10-CM

## 2019-05-17 DIAGNOSIS — K21.9 GASTROESOPHAGEAL REFLUX DISEASE WITHOUT ESOPHAGITIS: ICD-10-CM

## 2019-05-17 DIAGNOSIS — I10 ESSENTIAL HYPERTENSION: ICD-10-CM

## 2019-05-17 DIAGNOSIS — Z00.00 ANNUAL PHYSICAL EXAM: Primary | ICD-10-CM

## 2019-05-17 DIAGNOSIS — Z00.00 ANNUAL PHYSICAL EXAM: ICD-10-CM

## 2019-05-17 DIAGNOSIS — F41.9 ANXIETY: ICD-10-CM

## 2019-05-17 DIAGNOSIS — K58.2 IRRITABLE BOWEL SYNDROME WITH BOTH CONSTIPATION AND DIARRHEA: ICD-10-CM

## 2019-05-17 DIAGNOSIS — Z12.11 COLON CANCER SCREENING: ICD-10-CM

## 2019-05-17 DIAGNOSIS — K64.0 GRADE I HEMORRHOIDS: ICD-10-CM

## 2019-05-17 PROCEDURE — 99212 OFFICE O/P EST SF 10 MIN: CPT | Performed by: INTERNAL MEDICINE

## 2019-05-17 PROCEDURE — 80053 COMPREHEN METABOLIC PANEL: CPT

## 2019-05-17 PROCEDURE — 84443 ASSAY THYROID STIM HORMONE: CPT

## 2019-05-17 PROCEDURE — 84153 ASSAY OF PSA TOTAL: CPT

## 2019-05-17 PROCEDURE — 99213 OFFICE O/P EST LOW 20 MIN: CPT | Performed by: INTERNAL MEDICINE

## 2019-05-17 PROCEDURE — 99397 PER PM REEVAL EST PAT 65+ YR: CPT | Performed by: INTERNAL MEDICINE

## 2019-05-17 PROCEDURE — 80061 LIPID PANEL: CPT

## 2019-05-17 PROCEDURE — 36415 COLL VENOUS BLD VENIPUNCTURE: CPT

## 2019-05-17 PROCEDURE — 85025 COMPLETE CBC W/AUTO DIFF WBC: CPT

## 2019-05-17 NOTE — PATIENT INSTRUCTIONS
Prevention Guidelines, Men Ages 72 and Older  Screening tests and vaccines are an important part of managing your health. A screening test is done to find possible disorders or diseases in people who don't have any symptoms.  The goal is to find a disease Hepatitis C Men at increased risk for infection – talk with your healthcare provider At routine exams   High cholesterol or triglycerides All men in this age group At least every 5 years   HIV Men at increased risk for infection – talk with your healthcare Pneumococcal conjugate vaccine (PCV13) and pneumococcal polysaccharide vaccine (PPSV23) All men in this age group 1 dose of each vaccine   Tetanus/diphtheria/  pertussis (Td/Tdap) booster All men in this age group Td every 10 years, or Tdap if you will hav · Avoid alcohol, which can irritate your digestive tract and make your symptoms worse. · Eat more fiber if constipation is a problem. Fiber makes the stool softer and easier to pass through the colon.   Reduce stress  If stress or anxiety makes your IBS sy

## 2019-05-17 NOTE — PROGRESS NOTES
Patient ID: Malu Carroll is a 75year old male. Patient presents with:  Physical       HISTORY OF PRESENT ILLNESS:   HPI  Patient presents for above. Here for annual physical.  History of hypercholesterolemia on dual therapy.   Started his statin a Procedure Laterality Date   • CATARACT EXTRACTION EXTRACAPSULAR W/ INTRAOCULAR LENS IMPLANTATION Right 09/26/2017    Dr. 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     Apollo Rey MD  5/17/2019

## 2019-05-28 DIAGNOSIS — E03.8 OTHER SPECIFIED HYPOTHYROIDISM: ICD-10-CM

## 2019-05-29 RX ORDER — LEVOTHYROXINE SODIUM 0.1 MG/1
TABLET ORAL
Qty: 90 TABLET | Refills: 0 | OUTPATIENT
Start: 2019-05-29

## 2019-06-25 ENCOUNTER — APPOINTMENT (OUTPATIENT)
Dept: LAB | Age: 64
End: 2019-06-25
Attending: INTERNAL MEDICINE
Payer: COMMERCIAL

## 2019-06-25 DIAGNOSIS — Z12.11 COLON CANCER SCREENING: ICD-10-CM

## 2019-06-25 PROCEDURE — 82274 ASSAY TEST FOR BLOOD FECAL: CPT

## 2019-07-05 DIAGNOSIS — F32.89 OTHER DEPRESSION: ICD-10-CM

## 2019-07-06 RX ORDER — PAROXETINE HYDROCHLORIDE 20 MG/1
TABLET, FILM COATED ORAL
Qty: 135 TABLET | Refills: 0 | Status: SHIPPED | OUTPATIENT
Start: 2019-07-06 | End: 2019-10-05

## 2019-07-06 NOTE — TELEPHONE ENCOUNTER
Refill passed per [facility], [facility] protocol.   Refill Protocol Appointment Criteria  · Appointment scheduled in the past 6 months or in the next 3 months  Recent Outpatient Visits            1 month ago Annual physical exam    Dave

## 2019-08-25 DIAGNOSIS — E03.8 OTHER SPECIFIED HYPOTHYROIDISM: ICD-10-CM

## 2019-08-27 RX ORDER — LEVOTHYROXINE SODIUM 0.1 MG/1
TABLET ORAL
Qty: 90 TABLET | Refills: 1 | Status: SHIPPED | OUTPATIENT
Start: 2019-08-27 | End: 2020-02-24

## 2019-08-27 NOTE — TELEPHONE ENCOUNTER
Refill passed per [facility], [facility] protocol.   Hypothyroid Medications  Protocol Criteria:  Appointment scheduled in the past 12 months or the next 3 months  TSH resulted in the past 12 months that is normal  Recent Outpatient Visits            3 months ago Annual physical exam    Naomi Edgar Drue Pitman, MD    Office Visit    4 months ago Allergic urticaria    [facility] D[facility] Mara Weinberg MD    Office Visit    4 months ago Recurrent iritis, left    [facility] Mela Kaur MD    Office Visit    7 months ago Episcleritis of left eye    [facility] Mela Kaur MD    Office Visit    8 months ago Chronic iritis of both eyes    [facility] Mela Kaur MD    Office Visit        Future Appointments       Provider Department Appt Notes    In 2 months Massiel Alex MD [facility]         Lab Results   Component Value Date    TSH 2.370 05/17/2019

## 2019-09-22 ENCOUNTER — HOSPITAL ENCOUNTER (EMERGENCY)
Facility: HOSPITAL | Age: 64
Discharge: HOME | End: 2019-09-22
Attending: EMERGENCY MEDICINE
Payer: COMMERCIAL

## 2019-09-22 ENCOUNTER — APPOINTMENT (OUTPATIENT)
Dept: CT IMAGING | Facility: HOSPITAL | Age: 64
End: 2019-09-22
Attending: EMERGENCY MEDICINE
Payer: COMMERCIAL

## 2019-09-22 VITALS
WEIGHT: 182 LBS | DIASTOLIC BLOOD PRESSURE: 96 MMHG | OXYGEN SATURATION: 97 % | RESPIRATION RATE: 22 BRPM | HEART RATE: 81 BPM | SYSTOLIC BLOOD PRESSURE: 146 MMHG | HEIGHT: 72 IN | TEMPERATURE: 98 F | BODY MASS INDEX: 24.65 KG/M2

## 2019-09-22 DIAGNOSIS — R11.2 NAUSEA AND VOMITING IN ADULT: ICD-10-CM

## 2019-09-22 DIAGNOSIS — R10.30 LOWER ABDOMINAL PAIN: Primary | ICD-10-CM

## 2019-09-22 LAB
ANION GAP SERPL CALC-SCNC: 11 MMOL/L (ref 0–18)
BASOPHILS # BLD AUTO: 0.05 X10(3) UL (ref 0–0.2)
BASOPHILS NFR BLD AUTO: 0.5 %
BUN BLD-MCNC: 18 MG/DL (ref 7–18)
BUN/CREAT SERPL: 14.3 (ref 10–20)
CALCIUM BLD-MCNC: 10.7 MG/DL (ref 8.5–10.1)
CHLORIDE SERPL-SCNC: 107 MMOL/L (ref 98–112)
CO2 SERPL-SCNC: 25 MMOL/L (ref 21–32)
CREAT BLD-MCNC: 1.26 MG/DL (ref 0.7–1.3)
DEPRECATED RDW RBC AUTO: 41.7 FL (ref 35.1–46.3)
EOSINOPHIL # BLD AUTO: 0.01 X10(3) UL (ref 0–0.7)
EOSINOPHIL NFR BLD AUTO: 0.1 %
ERYTHROCYTE [DISTWIDTH] IN BLOOD BY AUTOMATED COUNT: 12.6 % (ref 11–15)
GLUCOSE BLD-MCNC: 129 MG/DL (ref 70–99)
HCT VFR BLD AUTO: 49.3 % (ref 39–53)
HGB BLD-MCNC: 17.3 G/DL (ref 13–17.5)
IMM GRANULOCYTES # BLD AUTO: 0.06 X10(3) UL (ref 0–1)
IMM GRANULOCYTES NFR BLD: 0.6 %
LYMPHOCYTES # BLD AUTO: 1.81 X10(3) UL (ref 1–4)
LYMPHOCYTES NFR BLD AUTO: 17.6 %
MCH RBC QN AUTO: 31.5 PG (ref 26–34)
MCHC RBC AUTO-ENTMCNC: 35.1 G/DL (ref 31–37)
MCV RBC AUTO: 89.6 FL (ref 80–100)
MONOCYTES # BLD AUTO: 0.87 X10(3) UL (ref 0.1–1)
MONOCYTES NFR BLD AUTO: 8.4 %
NEUTROPHILS # BLD AUTO: 7.5 X10 (3) UL (ref 1.5–7.7)
NEUTROPHILS # BLD AUTO: 7.5 X10(3) UL (ref 1.5–7.7)
NEUTROPHILS NFR BLD AUTO: 72.8 %
OSMOLALITY SERPL CALC.SUM OF ELEC: 300 MOSM/KG (ref 275–295)
PLATELET # BLD AUTO: 294 10(3)UL (ref 150–450)
POTASSIUM SERPL-SCNC: 4.3 MMOL/L (ref 3.5–5.1)
RBC # BLD AUTO: 5.5 X10(6)UL (ref 3.8–5.8)
SODIUM SERPL-SCNC: 143 MMOL/L (ref 136–145)
WBC # BLD AUTO: 10.3 X10(3) UL (ref 4–11)

## 2019-09-22 PROCEDURE — 96361 HYDRATE IV INFUSION ADD-ON: CPT

## 2019-09-22 PROCEDURE — 85025 COMPLETE CBC W/AUTO DIFF WBC: CPT | Performed by: EMERGENCY MEDICINE

## 2019-09-22 PROCEDURE — 80048 BASIC METABOLIC PNL TOTAL CA: CPT | Performed by: EMERGENCY MEDICINE

## 2019-09-22 PROCEDURE — 74177 CT ABD & PELVIS W/CONTRAST: CPT | Performed by: EMERGENCY MEDICINE

## 2019-09-22 PROCEDURE — 99284 EMERGENCY DEPT VISIT MOD MDM: CPT

## 2019-09-22 PROCEDURE — 96374 THER/PROPH/DIAG INJ IV PUSH: CPT

## 2019-09-22 RX ORDER — DICYCLOMINE HCL 20 MG
20 TABLET ORAL 4 TIMES DAILY PRN
Qty: 30 TABLET | Refills: 0 | Status: SHIPPED | OUTPATIENT
Start: 2019-09-22 | End: 2019-11-15

## 2019-09-22 RX ORDER — HYDROCODONE BITARTRATE AND ACETAMINOPHEN 5; 325 MG/1; MG/1
1 TABLET ORAL EVERY 6 HOURS PRN
Qty: 10 TABLET | Refills: 0 | Status: SHIPPED | OUTPATIENT
Start: 2019-09-22 | End: 2019-11-15 | Stop reason: ALTCHOICE

## 2019-09-22 RX ORDER — MORPHINE SULFATE 4 MG/ML
4 INJECTION, SOLUTION INTRAMUSCULAR; INTRAVENOUS ONCE
Status: COMPLETED | OUTPATIENT
Start: 2019-09-22 | End: 2019-09-22

## 2019-09-23 NOTE — ED PROVIDER NOTES
Patient Seen in: [facility] Emergency Department    History   Patient presents with:  Abdomen/Flank Pain (GI/)      HPI    The patient presents to the ED complaining of severe lower abdominal pain that started this afternoon.   He states that he since quittin.7      Smokeless tobacco: Never Used    Substance and Sexual Activity      Alcohol use: Yes        Comment: beer occasionally      Drug use: No    Other Topics      Concerns:        Caffeine Concern: Yes          coffee, 2 cups      ROS Narrative: The following orders were created for panel order CBC WITH DIFFERENTIAL WITH PLATELET.   Procedure                               Abnormality         Status                     ---------                               -----------         ------ acute other maladies. Patient reassured and feeling better with pain meds in the ED. I feel stable for discharge home with continued supportive care management at this time, patient will return to the ED if feeling worse.     Additional verbal instruction

## 2019-09-23 NOTE — ED INITIAL ASSESSMENT (HPI)
Pt presents to ED via [facility] for lower abdominal pain that started at 0600. Pt states he has had diarrhea and vomiting today. Pt has a hx of diverticulitis and IBS. Pt denies chest pain at this time.

## 2019-10-05 DIAGNOSIS — K21.9 GASTROESOPHAGEAL REFLUX DISEASE, ESOPHAGITIS PRESENCE NOT SPECIFIED: ICD-10-CM

## 2019-10-05 DIAGNOSIS — F32.89 OTHER DEPRESSION: ICD-10-CM

## 2019-10-08 RX ORDER — OMEPRAZOLE 20 MG/1
CAPSULE, DELAYED RELEASE ORAL
Qty: 90 CAPSULE | Refills: 1 | Status: SHIPPED | OUTPATIENT
Start: 2019-10-08 | End: 2020-04-18

## 2019-10-08 RX ORDER — PAROXETINE HYDROCHLORIDE 20 MG/1
TABLET, FILM COATED ORAL
Qty: 135 TABLET | Refills: 0 | Status: SHIPPED | OUTPATIENT
Start: 2019-10-08 | End: 2020-01-01

## 2019-10-13 DIAGNOSIS — I10 ESSENTIAL HYPERTENSION WITH GOAL BLOOD PRESSURE LESS THAN 130/85: ICD-10-CM

## 2019-10-13 RX ORDER — AMLODIPINE BESYLATE 10 MG/1
TABLET ORAL
Qty: 90 TABLET | Refills: 1 | Status: SHIPPED | OUTPATIENT
Start: 2019-10-13 | End: 2020-06-01

## 2019-10-13 NOTE — TELEPHONE ENCOUNTER
Refill passed per [facility], [facility] protocol.   Hypertensive Medications  Protocol Criteria:  · Appointment scheduled in the past 6 months or in the next 3 months  · BMP or CMP in the past 12 months  · Creatinine result < 2  Recent Outpatient Visits

## 2019-10-27 DIAGNOSIS — E78.00 HYPERCHOLESTEREMIA: ICD-10-CM

## 2019-10-28 DIAGNOSIS — E78.00 HYPERCHOLESTEREMIA: ICD-10-CM

## 2019-10-28 RX ORDER — ATORVASTATIN CALCIUM 10 MG/1
TABLET, FILM COATED ORAL
Qty: 90 TABLET | Refills: 1 | Status: SHIPPED | OUTPATIENT
Start: 2019-10-28 | End: 2020-04-27

## 2019-10-28 RX ORDER — ATORVASTATIN CALCIUM 10 MG/1
TABLET, FILM COATED ORAL
Qty: 90 TABLET | Refills: 0 | OUTPATIENT
Start: 2019-10-28

## 2019-11-15 ENCOUNTER — OFFICE VISIT (OUTPATIENT)
Dept: INTERNAL MEDICINE CLINIC | Facility: CLINIC | Age: 64
End: 2019-11-15
Payer: COMMERCIAL

## 2019-11-15 VITALS
WEIGHT: 178 LBS | TEMPERATURE: 98 F | HEART RATE: 73 BPM | SYSTOLIC BLOOD PRESSURE: 108 MMHG | DIASTOLIC BLOOD PRESSURE: 65 MMHG | HEIGHT: 72 IN | BODY MASS INDEX: 24.11 KG/M2

## 2019-11-15 DIAGNOSIS — I10 ESSENTIAL HYPERTENSION: Primary | ICD-10-CM

## 2019-11-15 DIAGNOSIS — E78.00 HYPERCHOLESTEREMIA: ICD-10-CM

## 2019-11-15 DIAGNOSIS — K58.2 IRRITABLE BOWEL SYNDROME WITH BOTH CONSTIPATION AND DIARRHEA: ICD-10-CM

## 2019-11-15 PROCEDURE — 99214 OFFICE O/P EST MOD 30 MIN: CPT | Performed by: INTERNAL MEDICINE

## 2019-11-15 RX ORDER — DICYCLOMINE HCL 20 MG
20 TABLET ORAL 4 TIMES DAILY PRN
Qty: 30 TABLET | Refills: 0 | Status: SHIPPED | OUTPATIENT
Start: 2019-11-15 | End: 2019-12-04

## 2019-11-15 RX ORDER — CHOLECALCIFEROL (VITAMIN D3) 125 MCG
CAPSULE ORAL
COMMUNITY

## 2019-11-15 NOTE — PATIENT INSTRUCTIONS
What is Irritable Bowel Syndrome (IBS)? Muscle contraction moves food through the digestive tract. People who have irritable bowel syndrome (IBS) have digestive tracts that react abnormally to certain substances or to stress.  This leads to sympt · While stress and anxiety worsen the symptoms of IBS, it is not believed to be the cause.   What you can do  Recommendations include:  · Certain medicines may help regulate the working of your digestive tract.  Your healthcare provider may prescribe one or · Eat more fiber if constipation is a problem. Fiber makes the stool softer and easier to pass through the colon. Reduce stress  If stress or anxiety makes your IBS symptoms worse, learning how to manage stress may help you feel better.  Try these tips:  ·

## 2019-11-15 NOTE — PROGRESS NOTES
Patient ID: Tequila Madera is a 75year old male. Patient presents with: Follow - Up       HISTORY OF PRESENT ILLNESS:   HPI  Patient presents for above. Here for follow-up of multiple issues. History of high blood pressure on medications.   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

## 2019-12-04 DIAGNOSIS — K58.2 IRRITABLE BOWEL SYNDROME WITH BOTH CONSTIPATION AND DIARRHEA: ICD-10-CM

## 2019-12-04 RX ORDER — DICYCLOMINE HCL 20 MG
TABLET ORAL
Qty: 30 TABLET | Refills: 0 | Status: SHIPPED | OUTPATIENT
Start: 2019-12-04 | End: 2019-12-23

## 2019-12-22 DIAGNOSIS — K58.2 IRRITABLE BOWEL SYNDROME WITH BOTH CONSTIPATION AND DIARRHEA: ICD-10-CM

## 2019-12-24 RX ORDER — DICYCLOMINE HCL 20 MG
TABLET ORAL
Qty: 30 TABLET | Refills: 0 | Status: SHIPPED | OUTPATIENT
Start: 2019-12-24 | End: 2020-01-12

## 2019-12-24 NOTE — TELEPHONE ENCOUNTER
Review pended refill request as it does not fall under a protocol.     Last Rx: 12/4/19  LOV: 11/15/19

## 2019-12-31 DIAGNOSIS — F32.89 OTHER DEPRESSION: ICD-10-CM

## 2020-01-01 RX ORDER — PAROXETINE HYDROCHLORIDE 20 MG/1
TABLET, FILM COATED ORAL
Qty: 135 TABLET | Refills: 1 | Status: SHIPPED | OUTPATIENT
Start: 2020-01-01 | End: 2020-08-18

## 2020-01-01 NOTE — TELEPHONE ENCOUNTER
Refill passed per [facility] protocol.     Refill Protocol Appointment Criteria  · Appointment scheduled in the past 6 months or in the next 3 months  Recent Outpatient Visits            1 month ago Essential hypertension    [address]

## 2020-01-12 DIAGNOSIS — K58.2 IRRITABLE BOWEL SYNDROME WITH BOTH CONSTIPATION AND DIARRHEA: ICD-10-CM

## 2020-01-12 NOTE — TELEPHONE ENCOUNTER
Review pended refill request as it does not fall under a protocol.     Last Rx: 12/2019  LOV: 1 month ago

## 2020-01-13 RX ORDER — DICYCLOMINE HCL 20 MG
TABLET ORAL
Qty: 30 TABLET | Refills: 0 | Status: SHIPPED | OUTPATIENT
Start: 2020-01-13 | End: 2020-02-16

## 2020-02-16 DIAGNOSIS — K58.2 IRRITABLE BOWEL SYNDROME WITH BOTH CONSTIPATION AND DIARRHEA: ICD-10-CM

## 2020-02-17 RX ORDER — DICYCLOMINE HCL 20 MG
TABLET ORAL
Qty: 30 TABLET | Refills: 1 | Status: SHIPPED | OUTPATIENT
Start: 2020-02-17 | End: 2020-04-11

## 2020-02-17 NOTE — TELEPHONE ENCOUNTER
LR 1-13-20 # 30 pls advise, thanks in advance.        Refill Protocol Appointment Criteria  · Appointment scheduled in the past 12 months or in the next 3 months  Recent Outpatient Visits            3 months ago Essential hypertension    [address]

## 2020-02-23 DIAGNOSIS — E03.8 OTHER SPECIFIED HYPOTHYROIDISM: ICD-10-CM

## 2020-02-24 RX ORDER — LEVOTHYROXINE SODIUM 0.1 MG/1
TABLET ORAL
Qty: 90 TABLET | Refills: 1 | Status: SHIPPED | OUTPATIENT
Start: 2020-02-24 | End: 2020-08-27

## 2020-02-24 NOTE — TELEPHONE ENCOUNTER
Refill passed per [facility] protocol.     Hypothyroid Medications  Protocol Criteria:  Appointment scheduled in the past 12 months or the next 3 months  TSH resulted in the past 12 months that is normal  Recent Outpatient Visits            3 months ago Essential hypertension    [facility], [address] Lonna Carl, MD    Office Visit    9 months ago Annual physical exam    Kulwant Pearl MD    Office Visit    10 months ago Allergic urticaria    [facility] Bebeto Hartmann MD    Office Visit    10 months ago Recurrent iritis, left    [facility] Ophthalmology Dylon Isabel MD    Office Visit    1 year ago Episcleritis of left eye    [facility] Ophthalmology Dylon Isabel MD    Office Visit        Future Appointments       Provider Department Appt Notes    In 3 months Kurt Richardson MD [facility], [address] px        Lab Results   Component Value Date    TSH 2.370 05/17/2019

## 2020-02-27 ENCOUNTER — TELEPHONE (OUTPATIENT)
Dept: INTERNAL MEDICINE CLINIC | Facility: CLINIC | Age: 65
End: 2020-02-27

## 2020-02-27 NOTE — TELEPHONE ENCOUNTER
Dr Claire Black Pt is requesting to get vaccines for TDAP and Flu  . Please sign pended orders if you agree . Pt has an appointment scheduled for a NV on 03/03/20 .

## 2020-03-03 ENCOUNTER — NURSE ONLY (OUTPATIENT)
Dept: INTERNAL MEDICINE CLINIC | Facility: CLINIC | Age: 65
End: 2020-03-03
Payer: COMMERCIAL

## 2020-03-03 DIAGNOSIS — Z23 NEED FOR TDAP VACCINATION: Primary | ICD-10-CM

## 2020-03-03 PROCEDURE — 90662 IIV NO PRSV INCREASED AG IM: CPT | Performed by: INTERNAL MEDICINE

## 2020-03-03 PROCEDURE — 90472 IMMUNIZATION ADMIN EACH ADD: CPT | Performed by: INTERNAL MEDICINE

## 2020-03-03 PROCEDURE — 90471 IMMUNIZATION ADMIN: CPT | Performed by: INTERNAL MEDICINE

## 2020-03-03 PROCEDURE — 90715 TDAP VACCINE 7 YRS/> IM: CPT | Performed by: INTERNAL MEDICINE

## 2020-03-03 NOTE — PROGRESS NOTES
Pt present to office for Tdap vaccination given right deltoid pt showed no reaction to injection  Flu shot given left deltoid pt showed no reaction to injection

## 2020-04-11 DIAGNOSIS — K58.2 IRRITABLE BOWEL SYNDROME WITH BOTH CONSTIPATION AND DIARRHEA: ICD-10-CM

## 2020-04-11 RX ORDER — DICYCLOMINE HCL 20 MG
TABLET ORAL
Qty: 30 TABLET | Refills: 1 | Status: SHIPPED | OUTPATIENT
Start: 2020-04-11 | End: 2020-06-01

## 2020-04-18 DIAGNOSIS — K21.9 GASTROESOPHAGEAL REFLUX DISEASE, ESOPHAGITIS PRESENCE NOT SPECIFIED: ICD-10-CM

## 2020-04-18 RX ORDER — OMEPRAZOLE 20 MG/1
CAPSULE, DELAYED RELEASE ORAL
Qty: 90 CAPSULE | Refills: 1 | Status: SHIPPED | OUTPATIENT
Start: 2020-04-18 | End: 2020-09-21

## 2020-04-18 NOTE — TELEPHONE ENCOUNTER
Refill passed per [facility] protocol.   Refill Protocol Appointment Criteria  · Appointment scheduled in the past 12 months or in the next 3 months  Recent Outpatient Visits            1 month ago Need for Tdap vaccination    Heri Mathis

## 2020-04-25 DIAGNOSIS — E78.00 HYPERCHOLESTEREMIA: ICD-10-CM

## 2020-04-27 RX ORDER — ATORVASTATIN CALCIUM 10 MG/1
TABLET, FILM COATED ORAL
Qty: 90 TABLET | Refills: 1 | Status: SHIPPED | OUTPATIENT
Start: 2020-04-27 | End: 2020-11-01

## 2020-05-26 ENCOUNTER — OFFICE VISIT (OUTPATIENT)
Dept: INTERNAL MEDICINE CLINIC | Facility: CLINIC | Age: 65
End: 2020-05-26
Payer: COMMERCIAL

## 2020-05-26 VITALS
TEMPERATURE: 97 F | HEIGHT: 72 IN | SYSTOLIC BLOOD PRESSURE: 123 MMHG | DIASTOLIC BLOOD PRESSURE: 72 MMHG | HEART RATE: 76 BPM | BODY MASS INDEX: 24.38 KG/M2 | WEIGHT: 180 LBS

## 2020-05-26 DIAGNOSIS — K21.9 GASTROESOPHAGEAL REFLUX DISEASE WITHOUT ESOPHAGITIS: ICD-10-CM

## 2020-05-26 DIAGNOSIS — E03.9 ACQUIRED HYPOTHYROIDISM: ICD-10-CM

## 2020-05-26 DIAGNOSIS — K58.2 IRRITABLE BOWEL SYNDROME WITH BOTH CONSTIPATION AND DIARRHEA: ICD-10-CM

## 2020-05-26 DIAGNOSIS — I10 ESSENTIAL HYPERTENSION: ICD-10-CM

## 2020-05-26 DIAGNOSIS — E78.00 HYPERCHOLESTEREMIA: ICD-10-CM

## 2020-05-26 DIAGNOSIS — Z00.00 ANNUAL PHYSICAL EXAM: Primary | ICD-10-CM

## 2020-05-26 DIAGNOSIS — F41.9 ANXIETY: ICD-10-CM

## 2020-05-26 DIAGNOSIS — Z23 NEED FOR VACCINATION: ICD-10-CM

## 2020-05-26 DIAGNOSIS — Z12.11 COLON CANCER SCREENING: ICD-10-CM

## 2020-05-26 PROCEDURE — 90670 PCV13 VACCINE IM: CPT | Performed by: INTERNAL MEDICINE

## 2020-05-26 PROCEDURE — 99397 PER PM REEVAL EST PAT 65+ YR: CPT | Performed by: INTERNAL MEDICINE

## 2020-05-26 PROCEDURE — 90471 IMMUNIZATION ADMIN: CPT | Performed by: INTERNAL MEDICINE

## 2020-05-26 PROCEDURE — 99213 OFFICE O/P EST LOW 20 MIN: CPT | Performed by: INTERNAL MEDICINE

## 2020-05-26 NOTE — PATIENT INSTRUCTIONS
Prevention Guidelines, Men Ages 72 and Older  Screening tests and vaccines are an important part of managing your health. A screening test is done to find possible disorders or diseases in people who don't have any symptoms.  The goal is to find a disease ea infection – talk with your healthcare provider At routine exams   High cholesterol or triglycerides All men in this age group At least every 5 years   HIV Men at increased risk for infection – talk with your healthcare provider At routine exams   Lung canc pneumococcal polysaccharide vaccine (PPSV23) All men in this age group 1 dose of each vaccine   Tetanus/diphtheria/  pertussis (Td/Tdap) booster All men in this age group Td every 10 years, or Tdap if you will have contact with a child younger than 13 darryl

## 2020-05-26 NOTE — PROGRESS NOTES
Patient ID: Muna Gutierrez is a 77year old male. Patient presents with:  Physical       HISTORY OF PRESENT ILLNESS:   HPI  Patient presents for above. Here for annual physical.  History of hypercholesterolemia on dual therapy.   Started his statin a EXTRACAPSULAR W/ INTRAOCULAR LENS IMPLANTATION Right 09/26/2017    Dr. Severa Mount @ [address]   • COLONOSCOPY  2013   • INGUINAL HERNIA REPAIR Right 1963   • INGUINAL HERNIA REPAIR Left 1970   • LEG/ANKLE SURGERY PROC UNLISTED Right     multiple reconstructive procedu HIVES    Comment:Lip swelling with Airborne exposure  Terbinafine             RASH    Comment:Likely SCLE, generalized eruption    Social History    Socioeconomic History      Marital status:       Spouse name: Not on file      Number of childre Asked        Back Care: Not Asked        Exercise: Not Asked        Bike Helmet: Not Asked        Seat Belt: Not Asked        Self-Exams: Not Asked        Grew up on a farm: Not Asked        History of tanning: Not Asked        Outdoor occupation: Not Aske gastro. 7. Anxiety  · Continue clonazepam.    8. Colon cancer screening  · OCCULT BLOOD, FECAL, IMMUNOASSAY    9. Need for vaccination  · PNEUMOCOCCAL VACC, 13 RAGHAV IM    Return in about 6 months (around 11/26/2020) for Routine Follow-Up.     Aurelio Moseley,

## 2020-05-27 ENCOUNTER — LAB ENCOUNTER (OUTPATIENT)
Dept: LAB | Age: 65
End: 2020-05-27
Attending: INTERNAL MEDICINE
Payer: COMMERCIAL

## 2020-05-27 DIAGNOSIS — Z00.00 ANNUAL PHYSICAL EXAM: ICD-10-CM

## 2020-05-27 DIAGNOSIS — E03.9 ACQUIRED HYPOTHYROIDISM: ICD-10-CM

## 2020-05-27 DIAGNOSIS — E78.00 HYPERCHOLESTEREMIA: ICD-10-CM

## 2020-05-27 PROCEDURE — 36415 COLL VENOUS BLD VENIPUNCTURE: CPT

## 2020-05-27 PROCEDURE — 84153 ASSAY OF PSA TOTAL: CPT

## 2020-05-27 PROCEDURE — 80053 COMPREHEN METABOLIC PANEL: CPT

## 2020-05-27 PROCEDURE — 82274 ASSAY TEST FOR BLOOD FECAL: CPT | Performed by: INTERNAL MEDICINE

## 2020-05-27 PROCEDURE — 84443 ASSAY THYROID STIM HORMONE: CPT

## 2020-05-27 PROCEDURE — 80061 LIPID PANEL: CPT

## 2020-05-27 PROCEDURE — 85025 COMPLETE CBC W/AUTO DIFF WBC: CPT

## 2020-05-31 DIAGNOSIS — K58.2 IRRITABLE BOWEL SYNDROME WITH BOTH CONSTIPATION AND DIARRHEA: ICD-10-CM

## 2020-05-31 DIAGNOSIS — I10 ESSENTIAL HYPERTENSION WITH GOAL BLOOD PRESSURE LESS THAN 130/85: ICD-10-CM

## 2020-06-01 RX ORDER — AMLODIPINE BESYLATE 10 MG/1
TABLET ORAL
Qty: 90 TABLET | Refills: 1 | Status: SHIPPED | OUTPATIENT
Start: 2020-06-01 | End: 2020-11-30

## 2020-06-01 RX ORDER — DICYCLOMINE HCL 20 MG
TABLET ORAL
Qty: 30 TABLET | Refills: 1 | Status: SHIPPED | OUTPATIENT
Start: 2020-06-01 | End: 2020-07-27

## 2020-07-26 DIAGNOSIS — K58.2 IRRITABLE BOWEL SYNDROME WITH BOTH CONSTIPATION AND DIARRHEA: ICD-10-CM

## 2020-07-27 RX ORDER — DICYCLOMINE HCL 20 MG
TABLET ORAL
Qty: 30 TABLET | Refills: 1 | Status: SHIPPED | OUTPATIENT
Start: 2020-07-27 | End: 2020-09-21

## 2020-07-29 ENCOUNTER — OFFICE VISIT (OUTPATIENT)
Dept: OPHTHALMOLOGY | Facility: CLINIC | Age: 65
End: 2020-07-29
Payer: COMMERCIAL

## 2020-07-29 DIAGNOSIS — H20.9 IRITIS OF RIGHT EYE: ICD-10-CM

## 2020-07-29 DIAGNOSIS — H43.393 FLOATER, VITREOUS, BILATERAL: ICD-10-CM

## 2020-07-29 DIAGNOSIS — Z96.1 PSEUDOPHAKIA, RIGHT EYE: ICD-10-CM

## 2020-07-29 DIAGNOSIS — H25.12 AGE-RELATED NUCLEAR CATARACT OF LEFT EYE: Primary | ICD-10-CM

## 2020-07-29 PROCEDURE — 92015 DETERMINE REFRACTIVE STATE: CPT | Performed by: OPHTHALMOLOGY

## 2020-07-29 PROCEDURE — 92014 COMPRE OPH EXAM EST PT 1/>: CPT | Performed by: OPHTHALMOLOGY

## 2020-07-29 NOTE — PATIENT INSTRUCTIONS
Age-related nuclear cataract of left eye  Discussed early cataracts with patient. Told patient that cataracts are age appropriate and they are not surgical at this time. No treatment recommended at this time. Ok to keep glasses the same.      Pseudophaki

## 2020-07-29 NOTE — ASSESSMENT & PLAN NOTE
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 STAY OFF Pred Forte drops.

## 2020-07-29 NOTE — ASSESSMENT & PLAN NOTE
Discussed early cataracts with patient. Told patient that cataracts are age appropriate and they are not surgical at this time. No treatment recommended at this time. Ok to keep glasses the same.

## 2020-07-29 NOTE — PROGRESS NOTES
Harrison Porras is a 77year old male. HPI:     HPI     Patient is here for a complete exam.  He states his vision is good. He denies blurry vision. He has floaters in both eyes which have remained stable.   He complains of a bump on his left upper e EVERY NIGHT 90 tablet 1   • OMEPRAZOLE 20 MG Oral Capsule Delayed Release TAKE 1 CAPSULE(20 MG) BY MOUTH EVERY MORNING 90 capsule 1   • LEVOTHYROXINE SODIUM 100 MCG Oral Tab TAKE 1 TABLET BY MOUTH BEFORE BREAKFAST 90 tablet 1   • PAROXETINE HCL 20 MG Oral pinguecula    Cornea Clear  trace Guttata    Anterior Chamber Deep and rare cell  Deep and quiet    Iris Normal Normal    Lens PC IOL with YAG  1+ Nuclear sclerosis, Trace Posterior subcapsular cataract    Vitreous Foley ring Foley ring          Fundus Exa

## 2020-08-16 DIAGNOSIS — F32.89 OTHER DEPRESSION: ICD-10-CM

## 2020-08-18 RX ORDER — PAROXETINE HYDROCHLORIDE 20 MG/1
TABLET, FILM COATED ORAL
Qty: 135 TABLET | Refills: 1 | Status: SHIPPED | OUTPATIENT
Start: 2020-08-18 | End: 2021-04-05

## 2020-08-26 DIAGNOSIS — E03.8 OTHER SPECIFIED HYPOTHYROIDISM: ICD-10-CM

## 2020-08-27 RX ORDER — LEVOTHYROXINE SODIUM 0.1 MG/1
TABLET ORAL
Qty: 90 TABLET | Refills: 1 | Status: SHIPPED | OUTPATIENT
Start: 2020-08-27 | End: 2021-03-03

## 2020-09-17 NOTE — ASSESSMENT & PLAN NOTE
Right YAG Laser Capsulotomy was done in the office today by Dr. Nisha Simmons with no complications. Pt will return 2-4 weeks PO dilate right eye.
No

## 2020-09-21 DIAGNOSIS — K21.9 GASTROESOPHAGEAL REFLUX DISEASE, ESOPHAGITIS PRESENCE NOT SPECIFIED: ICD-10-CM

## 2020-09-21 DIAGNOSIS — K58.2 IRRITABLE BOWEL SYNDROME WITH BOTH CONSTIPATION AND DIARRHEA: ICD-10-CM

## 2020-09-21 RX ORDER — OMEPRAZOLE 20 MG/1
CAPSULE, DELAYED RELEASE ORAL
Qty: 90 CAPSULE | Refills: 1 | Status: SHIPPED | OUTPATIENT
Start: 2020-09-21 | End: 2021-03-07

## 2020-09-21 RX ORDER — DICYCLOMINE HCL 20 MG
TABLET ORAL
Qty: 30 TABLET | Refills: 1 | Status: SHIPPED | OUTPATIENT
Start: 2020-09-21 | End: 2020-11-30

## 2020-10-31 DIAGNOSIS — E78.00 HYPERCHOLESTEREMIA: ICD-10-CM

## 2020-11-01 RX ORDER — ATORVASTATIN CALCIUM 10 MG/1
TABLET, FILM COATED ORAL
Qty: 90 TABLET | Refills: 1 | Status: SHIPPED | OUTPATIENT
Start: 2020-11-01 | End: 2021-05-02

## 2020-11-27 ENCOUNTER — OFFICE VISIT (OUTPATIENT)
Dept: INTERNAL MEDICINE CLINIC | Facility: CLINIC | Age: 65
End: 2020-11-27
Payer: COMMERCIAL

## 2020-11-27 VITALS
HEART RATE: 61 BPM | SYSTOLIC BLOOD PRESSURE: 136 MMHG | DIASTOLIC BLOOD PRESSURE: 72 MMHG | HEIGHT: 72 IN | BODY MASS INDEX: 23.98 KG/M2 | WEIGHT: 177 LBS | RESPIRATION RATE: 16 BRPM

## 2020-11-27 DIAGNOSIS — E78.00 HYPERCHOLESTEREMIA: ICD-10-CM

## 2020-11-27 DIAGNOSIS — I10 ESSENTIAL HYPERTENSION: Primary | ICD-10-CM

## 2020-11-27 DIAGNOSIS — K58.2 IRRITABLE BOWEL SYNDROME WITH BOTH CONSTIPATION AND DIARRHEA: ICD-10-CM

## 2020-11-27 DIAGNOSIS — Z23 NEED FOR VACCINATION: ICD-10-CM

## 2020-11-27 PROCEDURE — 3075F SYST BP GE 130 - 139MM HG: CPT | Performed by: INTERNAL MEDICINE

## 2020-11-27 PROCEDURE — 99072 ADDL SUPL MATRL&STAF TM PHE: CPT | Performed by: INTERNAL MEDICINE

## 2020-11-27 PROCEDURE — 3008F BODY MASS INDEX DOCD: CPT | Performed by: INTERNAL MEDICINE

## 2020-11-27 PROCEDURE — 90471 IMMUNIZATION ADMIN: CPT | Performed by: INTERNAL MEDICINE

## 2020-11-27 PROCEDURE — 90662 IIV NO PRSV INCREASED AG IM: CPT | Performed by: INTERNAL MEDICINE

## 2020-11-27 PROCEDURE — 99214 OFFICE O/P EST MOD 30 MIN: CPT | Performed by: INTERNAL MEDICINE

## 2020-11-27 PROCEDURE — 3078F DIAST BP <80 MM HG: CPT | Performed by: INTERNAL MEDICINE

## 2020-11-27 NOTE — PROGRESS NOTES
Patient ID: Sharmila Rudd is a 77year old male. Patient presents with: Follow - Up       HISTORY OF PRESENT ILLNESS:   HPI  Patient presents for above. Here for follow-up of multiple issues. History of high blood pressure on medications.   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

## 2020-11-27 NOTE — PATIENT INSTRUCTIONS
Low-Cholesterol Diet  Your body needs cholesterol to build new cells and create certain hormones. There are 2 kinds of cholesterol in your blood:     · HDL (“good”) cholesterol. This prevents fat deposits (plaque) from building up in your arteries.  In this High blood cholesterol is usually due to a diet high in saturated fat, along with not being physically active. In some cases, genetics plays a role in causing high cholesterol.  The tips below will help you create healthy eating habits that will help lower © 3365-7636 [employer] [address]. [address] [facility] [place] [address]. All rights reserved. This information is not intended as a substitute for professional medical care. Always follow your healthcare professional's instructions.         Low-Sal Ok: Ice cream, frozen yogurt, juice bars, gelatin, cookies and pies, sugar, honey, jelly, hard candy  Avoid: Most pies, cakes and cookies prepared or processed with salt; instant pudding  Drinks  Ok: Tea, coffee, fizzy (carbonated) drinks, juices  Avoid: F

## 2020-11-30 DIAGNOSIS — K58.2 IRRITABLE BOWEL SYNDROME WITH BOTH CONSTIPATION AND DIARRHEA: ICD-10-CM

## 2020-11-30 DIAGNOSIS — I10 ESSENTIAL HYPERTENSION WITH GOAL BLOOD PRESSURE LESS THAN 130/85: ICD-10-CM

## 2020-11-30 RX ORDER — AMLODIPINE BESYLATE 10 MG/1
10 TABLET ORAL DAILY
Qty: 90 TABLET | Refills: 1 | Status: SHIPPED | OUTPATIENT
Start: 2020-11-30 | End: 2021-05-24

## 2020-11-30 RX ORDER — DICYCLOMINE HCL 20 MG
20 TABLET ORAL 4 TIMES DAILY PRN
Qty: 30 TABLET | Refills: 1 | Status: SHIPPED | OUTPATIENT
Start: 2020-11-30 | End: 2021-03-07

## 2020-11-30 NOTE — TELEPHONE ENCOUNTER
Current Outpatient Medications:   •  DICYCLOMINE HCL 20 MG Oral Tab, TAKE 1 TABLET(20 MG) BY MOUTH FOUR TIMES DAILY AS NEEDED FOR ABDOMINAL PAIN, Disp: 30 tablet, Rfl: 1    •  AMLODIPINE BESYLATE 10 MG Oral Tab, TAKE 1 TABLET(10 MG) BY MOUTH DAILY, Dis

## 2021-03-03 DIAGNOSIS — E03.8 OTHER SPECIFIED HYPOTHYROIDISM: ICD-10-CM

## 2021-03-03 RX ORDER — LEVOTHYROXINE SODIUM 0.1 MG/1
100 TABLET ORAL
Qty: 90 TABLET | Refills: 1 | Status: SHIPPED | OUTPATIENT
Start: 2021-03-03 | End: 2021-08-24

## 2021-03-03 NOTE — TELEPHONE ENCOUNTER
Patient calling in, states he has contacted the pharmacy several times and they have been faxing over the request but unfortunately we did not receive it.  Patient is now completely out of medication and needs refill ASAP  LEVOTHYROXINE SODIUM 100 MCG Oral

## 2021-03-07 DIAGNOSIS — K21.9 GASTROESOPHAGEAL REFLUX DISEASE: ICD-10-CM

## 2021-03-07 DIAGNOSIS — K58.2 IRRITABLE BOWEL SYNDROME WITH BOTH CONSTIPATION AND DIARRHEA: ICD-10-CM

## 2021-03-07 RX ORDER — DICYCLOMINE HCL 20 MG
TABLET ORAL
Qty: 30 TABLET | Refills: 1 | Status: SHIPPED | OUTPATIENT
Start: 2021-03-07

## 2021-03-07 RX ORDER — OMEPRAZOLE 20 MG/1
CAPSULE, DELAYED RELEASE ORAL
Qty: 90 CAPSULE | Refills: 1 | Status: SHIPPED | OUTPATIENT
Start: 2021-03-07 | End: 2021-09-11

## 2021-04-05 DIAGNOSIS — F32.89 OTHER DEPRESSION: ICD-10-CM

## 2021-04-05 RX ORDER — PAROXETINE HYDROCHLORIDE 20 MG/1
TABLET, FILM COATED ORAL
Qty: 135 TABLET | Refills: 1 | Status: SHIPPED | OUTPATIENT
Start: 2021-04-05 | End: 2021-10-15

## 2021-05-01 DIAGNOSIS — E78.00 HYPERCHOLESTEREMIA: ICD-10-CM

## 2021-05-02 RX ORDER — ATORVASTATIN CALCIUM 10 MG/1
TABLET, FILM COATED ORAL
Qty: 90 TABLET | Refills: 1 | Status: SHIPPED | OUTPATIENT
Start: 2021-05-02 | End: 2021-10-30

## 2021-05-20 ENCOUNTER — OFFICE VISIT (OUTPATIENT)
Dept: INTERNAL MEDICINE CLINIC | Facility: CLINIC | Age: 66
End: 2021-05-20
Payer: COMMERCIAL

## 2021-05-20 VITALS
WEIGHT: 176 LBS | HEART RATE: 67 BPM | BODY MASS INDEX: 23.84 KG/M2 | HEIGHT: 72 IN | DIASTOLIC BLOOD PRESSURE: 73 MMHG | SYSTOLIC BLOOD PRESSURE: 131 MMHG

## 2021-05-20 DIAGNOSIS — Z00.00 ANNUAL PHYSICAL EXAM: Primary | ICD-10-CM

## 2021-05-20 DIAGNOSIS — E78.00 HYPERCHOLESTEREMIA: ICD-10-CM

## 2021-05-20 DIAGNOSIS — E03.9 ACQUIRED HYPOTHYROIDISM: ICD-10-CM

## 2021-05-20 DIAGNOSIS — Z12.11 COLON CANCER SCREENING: ICD-10-CM

## 2021-05-20 DIAGNOSIS — K21.9 GASTROESOPHAGEAL REFLUX DISEASE WITHOUT ESOPHAGITIS: ICD-10-CM

## 2021-05-20 DIAGNOSIS — I10 ESSENTIAL HYPERTENSION: ICD-10-CM

## 2021-05-20 DIAGNOSIS — K58.2 IRRITABLE BOWEL SYNDROME WITH BOTH CONSTIPATION AND DIARRHEA: ICD-10-CM

## 2021-05-20 DIAGNOSIS — F41.9 ANXIETY: ICD-10-CM

## 2021-05-20 PROCEDURE — 99397 PER PM REEVAL EST PAT 65+ YR: CPT | Performed by: INTERNAL MEDICINE

## 2021-05-20 PROCEDURE — 99213 OFFICE O/P EST LOW 20 MIN: CPT | Performed by: INTERNAL MEDICINE

## 2021-05-20 PROCEDURE — 3008F BODY MASS INDEX DOCD: CPT | Performed by: INTERNAL MEDICINE

## 2021-05-20 PROCEDURE — 3078F DIAST BP <80 MM HG: CPT | Performed by: INTERNAL MEDICINE

## 2021-05-20 PROCEDURE — 3075F SYST BP GE 130 - 139MM HG: CPT | Performed by: INTERNAL MEDICINE

## 2021-05-20 NOTE — PROGRESS NOTES
Patient ID: Meghna Olivarez is a 74year old male. Patient presents with:  Physical       HISTORY OF PRESENT ILLNESS:   HPI  Patient presents for above. Here for annual physical.  History of hypercholesterolemia on dual therapy.   Started his statin a EXTRACAPSULAR W/ INTRAOCULAR LENS IMPLANTATION Right 09/26/2017    Dr. Severa Mount @ [facility]   • COLONOSCOPY  2013   • INGUINAL HERNIA REPAIR Right 1963   • INGUINAL HERNIA REPAIR Left 1970   • LEG/ANKLE SURGERY PROC UNLISTED Right     multiple reconstructive procedu Allergies:  Eucalyptus Oil          HIVES    Comment:Lip swelling with Airborne exposure  Terbinafine             RASH    Comment:Likely SCLE, generalized eruption    Social History    Socioeconomic History      Marital status:       Spouse name Session:   Stress:       Feeling of Stress :   Social Connections:       Frequency of Communication with Friends and Family:       Frequency of Social Gatherings with Friends and Family:       Attends [employer] Services:       Active Member of Clubs or Org physical exam  · CBC WITH DIFFERENTIAL WITH PLATELET; Future  · COMP METABOLIC PANEL (14); Future  · LIPID PANEL; Future  · TSH W REFLEX TO FREE T4; Future  · PSA, DIAGNOSTIC; Future    2. Essential hypertension  · MICROALB/CREAT RATIO, RANDOM URINE;  [employer]

## 2021-05-21 ENCOUNTER — LAB ENCOUNTER (OUTPATIENT)
Dept: LAB | Age: 66
End: 2021-05-21
Attending: INTERNAL MEDICINE
Payer: COMMERCIAL

## 2021-05-21 DIAGNOSIS — I10 ESSENTIAL HYPERTENSION: ICD-10-CM

## 2021-05-21 DIAGNOSIS — E78.00 HYPERCHOLESTEREMIA: ICD-10-CM

## 2021-05-21 DIAGNOSIS — E03.9 ACQUIRED HYPOTHYROIDISM: ICD-10-CM

## 2021-05-21 DIAGNOSIS — Z00.00 ANNUAL PHYSICAL EXAM: ICD-10-CM

## 2021-05-21 PROCEDURE — 84153 ASSAY OF PSA TOTAL: CPT

## 2021-05-21 PROCEDURE — 85025 COMPLETE CBC W/AUTO DIFF WBC: CPT

## 2021-05-21 PROCEDURE — 80053 COMPREHEN METABOLIC PANEL: CPT

## 2021-05-21 PROCEDURE — 82043 UR ALBUMIN QUANTITATIVE: CPT

## 2021-05-21 PROCEDURE — 36415 COLL VENOUS BLD VENIPUNCTURE: CPT

## 2021-05-21 PROCEDURE — 84443 ASSAY THYROID STIM HORMONE: CPT

## 2021-05-21 PROCEDURE — 80061 LIPID PANEL: CPT

## 2021-05-21 PROCEDURE — 82570 ASSAY OF URINE CREATININE: CPT

## 2021-05-24 ENCOUNTER — MED REC SCAN ONLY (OUTPATIENT)
Dept: INTERNAL MEDICINE CLINIC | Facility: CLINIC | Age: 66
End: 2021-05-24

## 2021-05-24 DIAGNOSIS — I10 ESSENTIAL HYPERTENSION WITH GOAL BLOOD PRESSURE LESS THAN 130/85: ICD-10-CM

## 2021-05-24 PROBLEM — R80.9 MICROALBUMINURIA: Status: ACTIVE | Noted: 2021-05-24

## 2021-05-24 RX ORDER — AMLODIPINE BESYLATE 10 MG/1
TABLET ORAL
Qty: 90 TABLET | Refills: 1 | Status: SHIPPED | OUTPATIENT
Start: 2021-05-24 | End: 2021-11-28

## 2021-07-28 ENCOUNTER — OFFICE VISIT (OUTPATIENT)
Dept: OPHTHALMOLOGY | Facility: CLINIC | Age: 66
End: 2021-07-28
Payer: COMMERCIAL

## 2021-07-28 DIAGNOSIS — Z96.1 PSEUDOPHAKIA, RIGHT EYE: ICD-10-CM

## 2021-07-28 DIAGNOSIS — H43.393 FLOATER, VITREOUS, BILATERAL: ICD-10-CM

## 2021-07-28 DIAGNOSIS — H25.12 AGE-RELATED NUCLEAR CATARACT OF LEFT EYE: Primary | ICD-10-CM

## 2021-07-28 PROCEDURE — 92015 DETERMINE REFRACTIVE STATE: CPT | Performed by: OPHTHALMOLOGY

## 2021-07-28 PROCEDURE — 92014 COMPRE OPH EXAM EST PT 1/>: CPT | Performed by: OPHTHALMOLOGY

## 2021-07-28 NOTE — PROGRESS NOTES
Kang Munoz is a 74year old male. HPI:     HPI     Pt is here for a complete exam. Pt complains of waking up with a sore feeling over left eye sometimes. Pt complains of still having some floaters in his vision both eyes that come and go.  Pt sta MORNING 90 capsule 1   • DICYCLOMINE HCL 20 MG Oral Tab TAKE 1 TABLET(20 MG) BY MOUTH FOUR TIMES DAILY AS NEEDED 30 tablet 1   • Levothyroxine Sodium 100 MCG Oral Tab Take 1 tablet (100 mcg total) by mouth every morning before breakfast. 90 tablet 1   • Me Slit Lamp and Fundus Exam     Slit Lamp Exam       Right Left    Lids/Lashes Dermatochalasis, Meibomian gland dysfunction Dermatochalasis, Meibomian gland dysfunction     Conjunctiva/Sclera Temp pinguecula Temp pinguecula    Cornea Clear  trace

## 2021-07-28 NOTE — PATIENT INSTRUCTIONS
Pseudophakia, right eye  No treatment. Floater, vitreous, bilateral  No treatment. Age-related nuclear cataract of left eye  No treatment is needed on left cataract at this time. New glasses improves vision slightly; suggest update as needed.

## 2021-07-28 NOTE — ASSESSMENT & PLAN NOTE
No treatment is needed on left cataract at this time. New glasses improves vision slightly; suggest update as needed.

## 2021-08-23 DIAGNOSIS — E03.8 OTHER SPECIFIED HYPOTHYROIDISM: ICD-10-CM

## 2021-08-24 RX ORDER — LEVOTHYROXINE SODIUM 0.1 MG/1
100 TABLET ORAL
Qty: 90 TABLET | Refills: 1 | Status: SHIPPED | OUTPATIENT
Start: 2021-08-24

## 2021-08-24 NOTE — TELEPHONE ENCOUNTER
Refill passed per [facility] protocol    Requested Prescriptions   Pending Prescriptions Disp Refills    LEVOTHYROXINE 100 MCG Oral Tab [Pharmacy Med Name: LEVOTHYROXINE 0.100MG (100MCG) TAB] 90 tablet 1     Sig: TAKE 1 TABLET(100 MCG) BY MOUTH EVERY

## 2021-09-05 ENCOUNTER — APPOINTMENT (OUTPATIENT)
Dept: GENERAL RADIOLOGY | Facility: HOSPITAL | Age: 66
End: 2021-09-05
Attending: EMERGENCY MEDICINE
Payer: COMMERCIAL

## 2021-09-05 ENCOUNTER — HOSPITAL ENCOUNTER (EMERGENCY)
Facility: HOSPITAL | Age: 66
Discharge: HOME | End: 2021-09-05
Attending: EMERGENCY MEDICINE
Payer: COMMERCIAL

## 2021-09-05 VITALS
DIASTOLIC BLOOD PRESSURE: 71 MMHG | RESPIRATION RATE: 18 BRPM | TEMPERATURE: 97 F | BODY MASS INDEX: 23.3 KG/M2 | OXYGEN SATURATION: 96 % | SYSTOLIC BLOOD PRESSURE: 113 MMHG | HEART RATE: 70 BPM | HEIGHT: 72 IN | WEIGHT: 172 LBS

## 2021-09-05 DIAGNOSIS — M25.559 HIP PAIN: Primary | ICD-10-CM

## 2021-09-05 PROCEDURE — 99283 EMERGENCY DEPT VISIT LOW MDM: CPT

## 2021-09-05 PROCEDURE — 73502 X-RAY EXAM HIP UNI 2-3 VIEWS: CPT | Performed by: EMERGENCY MEDICINE

## 2021-09-05 RX ORDER — TRAMADOL HYDROCHLORIDE 50 MG/1
50 TABLET ORAL EVERY 6 HOURS PRN
Qty: 10 TABLET | Refills: 0 | Status: SHIPPED | OUTPATIENT
Start: 2021-09-05 | End: 2021-09-12

## 2021-09-05 NOTE — ED INITIAL ASSESSMENT (HPI)
Left hip pain starting after working on his windows a couple days ago. Pain in right hip now also. States history of hip surgery with novel right leg prosthesis. Jordan Durand recently.

## 2021-09-05 NOTE — ED PROVIDER NOTES
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 Genitourinary: Negative for dysuria. Musculoskeletal: Positive for arthralgias. Negative for back pain. Skin: Negative for rash. Neurological: Negative for dizziness.    Psychiatric/Behavioral: Sofie Simmons 9/5/2021  CONCLUSION:   No evidence of acute fracture or dislocation. Chronic appearing deformity of the proximal and distal foreshortened right femur with evidence of postsurgical change distal right femur. Mild left hip osteoarthritis.     Dictated by ( recommend that you schedule follow up care with a primary care provider within the next three months to obtain basic health screening including reassessment of your blood pressure.       Medications Prescribed:  Current Discharge Medication List    START ta

## 2021-09-10 DIAGNOSIS — K21.9 GASTROESOPHAGEAL REFLUX DISEASE: ICD-10-CM

## 2021-09-11 RX ORDER — OMEPRAZOLE 20 MG/1
20 CAPSULE, DELAYED RELEASE ORAL EVERY MORNING
Qty: 90 CAPSULE | Refills: 1 | Status: SHIPPED | OUTPATIENT
Start: 2021-09-11 | End: 2022-02-14

## 2021-09-11 NOTE — TELEPHONE ENCOUNTER
Refill passed per [employer] [facility] protocol.     Requested Prescriptions   Pending Prescriptions Disp Refills    OMEPRAZOLE 20 MG Oral Capsule Delayed Release [Pharmacy Med Name: OMEPRAZOLE 20MG CAPSULES] 90 capsule 1     Sig: TAKE ONE CAPSULE BY MOUTH EVERY MORNING        Gastrointestional Medication Protocol Passed - 9/10/2021  6:52 PM        Passed - Appointment in past 12 or next 3 months              Future Appointments         Provider Department Appt Notes    In 2 months Deion Davey MD [place] [employer], [facility], [address], [address] 6M F/U            Recent Outpatient Visits              1 month ago Age-related nuclear cataract of left eye    [facility] for [facility] Ophthalmology Vikki Santoro MD    Office Visit    3 months ago Annual physical exam    Juan Carlos Monique MD    Office Visit    9 months ago Essential hypertension    Naomi Olivarez Nelle Neighbours, MD    Office Visit    1 year ago Age-related nuclear cataract of left eye    [facility] for Health Ophthalmology Vikki Santoro MD    Office Visit    1 year ago Annual physical exam    Patricia Olivarez MD    Office Visit

## 2021-10-15 DIAGNOSIS — F32.89 OTHER DEPRESSION: ICD-10-CM

## 2021-10-15 RX ORDER — PAROXETINE HYDROCHLORIDE 20 MG/1
30 TABLET, FILM COATED ORAL EVERY MORNING
Qty: 135 TABLET | Refills: 1 | Status: SHIPPED | OUTPATIENT
Start: 2021-10-15 | End: 2022-04-13

## 2021-10-15 NOTE — TELEPHONE ENCOUNTER
Refill passed per [employer] [facility] protocol.     Requested Prescriptions   Pending Prescriptions Disp Refills    PAROXETINE 20 MG Oral Tab [Pharmacy Med Name: PAROXETINE 20MG TABLETS] 135 tablet 1     Sig: TAKE ONE AND ONE-HALF TABLETS BY MOUTH EVERY MORNING        Psychiatric Non-Scheduled (Anti-Anxiety) Passed - 10/15/2021  2:09 PM        Passed - Appointment in last 6 or next 3 months              Future Appointments         Provider Department Appt Notes    In 1 month Ayden Contreras MD [place] [employer] [place], [facility], [address] 6M F/U            Recent Outpatient Visits              2 months ago Age-related nuclear cataract of left eye    [facility] Ophthalmology Leonard Bernard MD    Office Visit    4 months ago Annual physical exam    Enrico Guerrero MD    Office Visit    10 months ago Essential hypertension    Cindy Ni, Kiah Salgado MD    Office Visit    1 year ago Age-related nuclear cataract of left eye    [facility] for [facility] Ophthalmology Leonard Bernard MD    Office Visit    1 year ago Annual physical exam    Tawny Gr MD    Office Visit

## 2021-10-30 DIAGNOSIS — E78.00 HYPERCHOLESTEREMIA: ICD-10-CM

## 2021-10-30 RX ORDER — ATORVASTATIN CALCIUM 10 MG/1
10 TABLET, FILM COATED ORAL NIGHTLY
Qty: 90 TABLET | Refills: 1 | Status: SHIPPED | OUTPATIENT
Start: 2021-10-30 | End: 2022-05-04

## 2021-10-31 NOTE — TELEPHONE ENCOUNTER
Refill passed per [employer] protocol.     Requested Prescriptions   Pending Prescriptions Disp Refills    ATORVASTATIN 10 MG Oral Tab [Pharmacy Med Name: ATORVASTATIN 10MG TABLETS] 90 tablet 1     Sig: TAKE 1 TABLET(10 MG) BY MOUTH EVERY NIGHT        Cholesterol Medication Protocol Passed - 10/30/2021  3:59 PM        Passed - ALT in past 12 months        Passed - LDL in past 12 months        Passed - Last ALT < 80       Lab Results   Component Value Date    ALT 54 05/21/2021             Passed - Last LDL < 130     Lab Results   Component Value Date     (H) 05/21/2021               Passed - Appointment in past 12 or next 3 months              Recent Outpatient Visits              3 months ago Age-related nuclear cataract of left eye    [facility] for Health Ophthalmology Vikki Santoro MD    Office Visit    5 months ago Annual physical exam    Patricia Olivarez MD    Office Visit    11 months ago Essential hypertension    Naomi Olivarez [place], MD    Office Visit    1 year ago Age-related nuclear cataract of left eye    [facility] Ophthalmology Vikki Santoro MD    Office Visit    1 year ago Annual physical exam    Juan Carlos Monique MD    Office Visit            Future Appointments         Provider Department Appt Notes    In 3 weeks Deion Davey MD [place] [employer] [place], [facility], [place] SCHWARZ F/U

## 2021-11-23 ENCOUNTER — MED REC SCAN ONLY (OUTPATIENT)
Dept: INTERNAL MEDICINE CLINIC | Facility: CLINIC | Age: 66
End: 2021-11-23

## 2021-11-23 ENCOUNTER — OFFICE VISIT (OUTPATIENT)
Dept: INTERNAL MEDICINE CLINIC | Facility: CLINIC | Age: 66
End: 2021-11-23
Payer: COMMERCIAL

## 2021-11-23 ENCOUNTER — LAB ENCOUNTER (OUTPATIENT)
Dept: LAB | Age: 66
End: 2021-11-23
Attending: INTERNAL MEDICINE
Payer: COMMERCIAL

## 2021-11-23 VITALS
WEIGHT: 169 LBS | DIASTOLIC BLOOD PRESSURE: 71 MMHG | HEART RATE: 71 BPM | SYSTOLIC BLOOD PRESSURE: 135 MMHG | BODY MASS INDEX: 22.89 KG/M2 | HEIGHT: 72 IN

## 2021-11-23 DIAGNOSIS — I10 PRIMARY HYPERTENSION: Primary | ICD-10-CM

## 2021-11-23 DIAGNOSIS — I10 PRIMARY HYPERTENSION: ICD-10-CM

## 2021-11-23 DIAGNOSIS — R80.9 MICROALBUMINURIA: ICD-10-CM

## 2021-11-23 DIAGNOSIS — Z23 NEED FOR VACCINATION: ICD-10-CM

## 2021-11-23 DIAGNOSIS — Z02.89 ENCOUNTER FOR COMPLETION OF FORM WITH PATIENT: ICD-10-CM

## 2021-11-23 PROCEDURE — 82570 ASSAY OF URINE CREATININE: CPT

## 2021-11-23 PROCEDURE — 82043 UR ALBUMIN QUANTITATIVE: CPT

## 2021-11-23 PROCEDURE — 90662 IIV NO PRSV INCREASED AG IM: CPT | Performed by: INTERNAL MEDICINE

## 2021-11-23 PROCEDURE — 99214 OFFICE O/P EST MOD 30 MIN: CPT | Performed by: INTERNAL MEDICINE

## 2021-11-23 PROCEDURE — 90472 IMMUNIZATION ADMIN EACH ADD: CPT | Performed by: INTERNAL MEDICINE

## 2021-11-23 PROCEDURE — 90732 PPSV23 VACC 2 YRS+ SUBQ/IM: CPT | Performed by: INTERNAL MEDICINE

## 2021-11-23 PROCEDURE — 3008F BODY MASS INDEX DOCD: CPT | Performed by: INTERNAL MEDICINE

## 2021-11-23 PROCEDURE — 90471 IMMUNIZATION ADMIN: CPT | Performed by: INTERNAL MEDICINE

## 2021-11-23 PROCEDURE — 3078F DIAST BP <80 MM HG: CPT | Performed by: INTERNAL MEDICINE

## 2021-11-23 PROCEDURE — 3075F SYST BP GE 130 - 139MM HG: CPT | Performed by: INTERNAL MEDICINE

## 2021-11-23 NOTE — PROGRESS NOTES
Patient ID: Christina Guzman is a 74year old male. Patient presents with: Follow - Up: 6 month follow up        HISTORY OF PRESENT ILLNESS:   HPI  Patient presents for above. Here for 6-month follow-up.     History of primary hypertension on monother (30 mg total) by mouth every morning., Disp: 135 tablet, Rfl: 1  •  omeprazole 20 MG Oral Capsule Delayed Release, Take 1 capsule (20 mg total) by mouth every morning., Disp: 90 capsule, Rfl: 1  •  levothyroxine 100 MCG Oral Tab, Take 1 tablet (100 mcg tot Drug use: No      Sexual activity: Not on file    Other Topics      Concerns:         Service: Not Asked        Blood Transfusions: Not Asked        Caffeine Concern: Yes          coffee, 2 cups        Occupational Exposure: Not Asked        Michael Herrera Future  · Well-controlled. · Continue monotherapy. · Low-salt diet. 2. Microalbuminuria  · MICROALB/CREAT RATIO, RANDOM URINE; Future    3. Need for vaccination  · FLU VACC HIGH DOSE PRSV FREE  · PNEUMOCOCCAL IMM (PNEUMOVAX)    4.  Encounter for comple

## 2021-11-28 DIAGNOSIS — I10 ESSENTIAL HYPERTENSION WITH GOAL BLOOD PRESSURE LESS THAN 130/85: ICD-10-CM

## 2021-11-28 RX ORDER — AMLODIPINE BESYLATE 10 MG/1
10 TABLET ORAL DAILY
Qty: 90 TABLET | Refills: 1 | Status: SHIPPED | OUTPATIENT
Start: 2021-11-28 | End: 2022-05-29

## 2022-02-13 DIAGNOSIS — E03.8 OTHER SPECIFIED HYPOTHYROIDISM: ICD-10-CM

## 2022-02-13 DIAGNOSIS — K21.9 GASTROESOPHAGEAL REFLUX DISEASE: ICD-10-CM

## 2022-02-14 RX ORDER — LEVOTHYROXINE SODIUM 0.1 MG/1
TABLET ORAL
Qty: 90 TABLET | Refills: 1 | Status: SHIPPED | OUTPATIENT
Start: 2022-02-14 | End: 2022-08-22

## 2022-02-14 RX ORDER — OMEPRAZOLE 20 MG/1
CAPSULE, DELAYED RELEASE ORAL
Qty: 90 CAPSULE | Refills: 1 | Status: SHIPPED | OUTPATIENT
Start: 2022-02-14 | End: 2022-08-15

## 2022-02-15 NOTE — TELEPHONE ENCOUNTER
Refill passed per [place] [facility] protocol.      Requested Prescriptions   Pending Prescriptions Disp Refills    LEVOTHYROXINE 100 MCG Oral Tab [Pharmacy Med Name: LEVOTHYROXINE 0.100MG (100MCG) TAB] 90 tablet 1     Sig: TAKE 1 TABLET(100 MCG) BY MOUTH BEFORE BREAKFAST        Thyroid Medication Protocol Passed - 2/13/2022  2:03 PM        Passed - TSH in past 12 months        Passed - Last TSH value is normal     Lab Results   Component Value Date    TSH 1.340 05/21/2021                 Passed - Appointment in past 12 or next 3 months           OMEPRAZOLE 20 MG Oral Capsule Delayed Release [Pharmacy Med Name: OMEPRAZOLE 20MG CAPSULES] 90 capsule 1     Sig: TAKE 1 CAPSULE(20 MG) BY MOUTH EVERY MORNING        Gastrointestional Medication Protocol Passed - 2/13/2022  2:03 PM        Passed - Appointment in past 12 or next 3 months               Future Appointments         Provider Department Appt Notes    In 3 months Olive Arriola MD [facility], [facility], [address] [place] [address] 6 MONTH F/U             Recent Outpatient Visits              2 months ago Primary hypertension    Naomi Torres Christine Alter, MD    Office Visit    6 months ago Age-related nuclear cataract of left eye    [facility] for [facility] Ophthalmology Patricia Gutierrez MD    Office Visit    9 months ago Annual physical exam    Janina Valle MD    Office Visit    1 year ago Essential hypertension    Naomi Torres Christine Alter, MD    Office Visit    1 year ago Age-related nuclear cataract of left eye    [facility] Ophthalmology Patricia Gutierrez MD    Office Visit

## 2022-04-13 RX ORDER — PAROXETINE HYDROCHLORIDE 20 MG/1
TABLET, FILM COATED ORAL
Qty: 135 TABLET | Refills: 1 | Status: SHIPPED | OUTPATIENT
Start: 2022-04-13

## 2022-05-01 ENCOUNTER — HOSPITAL ENCOUNTER (EMERGENCY)
Facility: HOSPITAL | Age: 67
Discharge: HOME | End: 2022-05-01
Attending: EMERGENCY MEDICINE
Payer: COMMERCIAL

## 2022-05-01 ENCOUNTER — APPOINTMENT (OUTPATIENT)
Dept: GENERAL RADIOLOGY | Facility: HOSPITAL | Age: 67
End: 2022-05-01
Attending: EMERGENCY MEDICINE
Payer: COMMERCIAL

## 2022-05-01 VITALS
RESPIRATION RATE: 18 BRPM | SYSTOLIC BLOOD PRESSURE: 142 MMHG | HEIGHT: 72 IN | BODY MASS INDEX: 23.03 KG/M2 | OXYGEN SATURATION: 96 % | TEMPERATURE: 98 F | HEART RATE: 73 BPM | WEIGHT: 170 LBS | DIASTOLIC BLOOD PRESSURE: 83 MMHG

## 2022-05-01 DIAGNOSIS — M10.9 GOUT INVOLVING TOE OF LEFT FOOT, UNSPECIFIED CAUSE, UNSPECIFIED CHRONICITY: Primary | ICD-10-CM

## 2022-05-01 PROCEDURE — 73630 X-RAY EXAM OF FOOT: CPT | Performed by: EMERGENCY MEDICINE

## 2022-05-01 PROCEDURE — 99283 EMERGENCY DEPT VISIT LOW MDM: CPT

## 2022-05-01 RX ORDER — INDOMETHACIN 50 MG/1
50 CAPSULE ORAL
Qty: 15 CAPSULE | Refills: 0 | Status: SHIPPED | OUTPATIENT
Start: 2022-05-01 | End: 2022-05-06

## 2022-05-01 NOTE — ED INITIAL ASSESSMENT (HPI)
Pt arrived to ED from home c/o swelling to left foot x multiple weeks, pt reports unsure if any trauma. Pt reports pain with ambulation.

## 2022-05-03 DIAGNOSIS — E78.00 HYPERCHOLESTEREMIA: ICD-10-CM

## 2022-05-04 RX ORDER — ATORVASTATIN CALCIUM 10 MG/1
10 TABLET, FILM COATED ORAL NIGHTLY
Qty: 90 TABLET | Refills: 1 | Status: SHIPPED | OUTPATIENT
Start: 2022-05-04 | End: 2022-11-07

## 2022-05-04 NOTE — TELEPHONE ENCOUNTER
Refill passed per [address] protocol.      Requested Prescriptions   Pending Prescriptions Disp Refills    ATORVASTATIN 10 MG Oral Tab [Pharmacy Med Name: ATORVASTATIN 10MG TABLETS] 90 tablet 1     Sig: TAKE 1 TABLET(10 MG) BY MOUTH EVERY NIGHT        Cholesterol Medication Protocol Passed - 5/3/2022  5:39 PM        Passed - ALT in past 12 months        Passed - LDL in past 12 months        Passed - Last ALT < 80       Lab Results   Component Value Date    ALT 54 05/21/2021             Passed - Last LDL < 130     Lab Results   Component Value Date     (H) 05/21/2021               Passed - Appointment in past 12 or next 3 months                Recent Outpatient Visits              5 months ago Primary hypertension    [address], [address] Randalyn Silber, MD    Office Visit    9 months ago Age-related nuclear cataract of left eye    [facility] Ophthalmology Veda Salvador MD    Office Visit    11 months ago Annual physical exam    [place] [address], Lenny Matute MD    Office Visit    1 year ago Essential hypertension    [address], [address] Randalyn Silber, MD    Office Visit    1 year ago Age-related nuclear cataract of left eye    [facility] Ophthalmology Veda Salvador MD    Office Visit             Future Appointments         Provider Department Appt Notes    In 2 weeks Callum Cotton MD [address], [address], [address] [place] [address] F/U    In 2 months Veda Salvador MD [facility] for [facility] Ophthalmology EP/ EE

## 2022-05-24 ENCOUNTER — OFFICE VISIT (OUTPATIENT)
Dept: INTERNAL MEDICINE CLINIC | Facility: CLINIC | Age: 67
End: 2022-05-24
Payer: COMMERCIAL

## 2022-05-24 ENCOUNTER — LAB ENCOUNTER (OUTPATIENT)
Dept: LAB | Age: 67
End: 2022-05-24
Attending: INTERNAL MEDICINE
Payer: COMMERCIAL

## 2022-05-24 VITALS
DIASTOLIC BLOOD PRESSURE: 67 MMHG | BODY MASS INDEX: 22.75 KG/M2 | HEART RATE: 68 BPM | SYSTOLIC BLOOD PRESSURE: 118 MMHG | HEIGHT: 72 IN | WEIGHT: 168 LBS

## 2022-05-24 DIAGNOSIS — M10.072 ACUTE IDIOPATHIC GOUT INVOLVING TOE OF LEFT FOOT: Primary | ICD-10-CM

## 2022-05-24 DIAGNOSIS — F41.9 ANXIETY: ICD-10-CM

## 2022-05-24 DIAGNOSIS — M10.072 ACUTE IDIOPATHIC GOUT INVOLVING TOE OF LEFT FOOT: ICD-10-CM

## 2022-05-24 DIAGNOSIS — I10 PRIMARY HYPERTENSION: ICD-10-CM

## 2022-05-24 LAB — URATE SERPL-MCNC: 7.3 MG/DL

## 2022-05-24 PROCEDURE — 36415 COLL VENOUS BLD VENIPUNCTURE: CPT

## 2022-05-24 PROCEDURE — 99214 OFFICE O/P EST MOD 30 MIN: CPT | Performed by: INTERNAL MEDICINE

## 2022-05-24 PROCEDURE — 3008F BODY MASS INDEX DOCD: CPT | Performed by: INTERNAL MEDICINE

## 2022-05-24 PROCEDURE — 84550 ASSAY OF BLOOD/URIC ACID: CPT

## 2022-05-24 PROCEDURE — 3074F SYST BP LT 130 MM HG: CPT | Performed by: INTERNAL MEDICINE

## 2022-05-24 PROCEDURE — 3078F DIAST BP <80 MM HG: CPT | Performed by: INTERNAL MEDICINE

## 2022-05-24 RX ORDER — PAROXETINE HYDROCHLORIDE 40 MG/1
40 TABLET, FILM COATED ORAL EVERY MORNING
Qty: 90 TABLET | Refills: 0 | Status: SHIPPED | OUTPATIENT
Start: 2022-05-24

## 2022-05-29 DIAGNOSIS — I10 ESSENTIAL HYPERTENSION WITH GOAL BLOOD PRESSURE LESS THAN 130/85: ICD-10-CM

## 2022-05-30 RX ORDER — AMLODIPINE BESYLATE 10 MG/1
10 TABLET ORAL DAILY
Qty: 90 TABLET | Refills: 1 | Status: SHIPPED | OUTPATIENT
Start: 2022-05-30

## 2022-05-30 NOTE — TELEPHONE ENCOUNTER
Please review refill protocol failed/ no protocol  Requested Prescriptions   Pending Prescriptions Disp Refills    AMLODIPINE 10 MG Oral Tab [Pharmacy Med Name: AMLODIPINE BESYLATE 10MG TABLETS] 90 tablet 1     Sig: TAKE 1 TABLET(10 MG) BY MOUTH DAILY        Hypertensive Medications Protocol Failed - 5/29/2022  3:18 PM        Failed - CMP or BMP in past 12 months        Passed - Appointment in past 6 or next 3 months        Passed - GFR Non- > 50     Lab Results   Component Value Date    GFRNAA 71 05/21/2021

## 2022-07-13 ENCOUNTER — OFFICE VISIT (OUTPATIENT)
Dept: OPHTHALMOLOGY | Facility: CLINIC | Age: 67
End: 2022-07-13
Payer: COMMERCIAL

## 2022-07-13 DIAGNOSIS — Z96.1 PSEUDOPHAKIA, RIGHT EYE: ICD-10-CM

## 2022-07-13 DIAGNOSIS — H43.393 FLOATER, VITREOUS, BILATERAL: ICD-10-CM

## 2022-07-13 DIAGNOSIS — H25.12 AGE-RELATED NUCLEAR CATARACT OF LEFT EYE: Primary | ICD-10-CM

## 2022-07-13 PROCEDURE — 92014 COMPRE OPH EXAM EST PT 1/>: CPT | Performed by: OPHTHALMOLOGY

## 2022-07-13 RX ORDER — PREDNISOLONE ACETATE 10 MG/ML
1 SUSPENSION/ DROPS OPHTHALMIC 4 TIMES DAILY
Qty: 5 ML | Refills: 0 | Status: SHIPPED | OUTPATIENT
Start: 2022-07-13

## 2022-07-13 NOTE — ASSESSMENT & PLAN NOTE
No treatment. There is no evidence of retinal pathology. All signs and symptoms of retinal detachment/tears explained in detail. Patient instructed to call the office if they experience increase in floaters, increase in flashes of light, loss of vision or curtain or veil effect.

## 2022-07-18 ENCOUNTER — HOSPITAL ENCOUNTER (EMERGENCY)
Facility: HOSPITAL | Age: 67
Discharge: LEFT BEFORE DISCHARGE | End: 2022-07-18
Payer: COMMERCIAL

## 2022-08-14 DIAGNOSIS — K21.9 GASTROESOPHAGEAL REFLUX DISEASE: ICD-10-CM

## 2022-08-15 RX ORDER — OMEPRAZOLE 20 MG/1
CAPSULE, DELAYED RELEASE ORAL
Qty: 90 CAPSULE | Refills: 3 | Status: SHIPPED | OUTPATIENT
Start: 2022-08-15

## 2022-08-22 DIAGNOSIS — E03.8 OTHER SPECIFIED HYPOTHYROIDISM: ICD-10-CM

## 2022-08-22 RX ORDER — LEVOTHYROXINE SODIUM 0.1 MG/1
TABLET ORAL
Qty: 90 TABLET | Refills: 1 | Status: SHIPPED | OUTPATIENT
Start: 2022-08-22

## 2022-09-24 DIAGNOSIS — F41.9 ANXIETY: ICD-10-CM

## 2022-09-24 RX ORDER — PAROXETINE HYDROCHLORIDE 40 MG/1
40 TABLET, FILM COATED ORAL EVERY MORNING
Qty: 90 TABLET | Refills: 1 | Status: SHIPPED | OUTPATIENT
Start: 2022-09-24 | End: 2023-03-24

## 2022-09-24 NOTE — TELEPHONE ENCOUNTER
Refill passed per [address] protocol.   Requested Prescriptions   Pending Prescriptions Disp Refills    PAROXETINE HCL 40 MG Oral Tab [Pharmacy Med Name: PAROXETINE 40MG TABLETS] 90 tablet 0     Sig: TAKE 1 TABLET(40 MG) BY MOUTH EVERY MORNING        Psychiatric Non-Scheduled (Anti-Anxiety) Passed - 9/24/2022  1:46 PM        Passed - In person appointment or virtual visit in the past 6 mos or appointment in next 3 mos       Recent Outpatient Visits              2 months ago Age-related nuclear cataract of left eye    [facility] Kristen Owusu MD    Office Visit    4 months ago Acute idiopathic gout involving toe of left foot    [address] Naomi Gonzalez Lorriane Neth, MD    Office Visit    10 months ago Primary hypertension    [address] Naomi Gonzalez Lorriane Neth, MD    Office Visit    1 year ago Age-related nuclear cataract of left eye    [facility] Kristen Owusu MD    Office Visit    1 year ago Annual physical exam    Denny Guerrero MD    Office Visit     Future Appointments         Provider Department Appt Notes    In 2 months Caprice Smith MD [address], [address] [facility] 6 month follow up/flu shot if available care partner policy informed to pt  per the patient this is a 6 month follow up not a physical.    In 9 months Kristen Owusu MD [facility]hthal[facility] ep/ee                   Recent Outpatient Visits              2 months ago Age-related nuclear cataract of left eye    [facility] Kristen Owusu MD    Office Visit    4 months ago Acute idiopathic gout involving toe of left foot    Naomi Guerrero Lorriane Neth, MD    Office Visit    10 months ago Primary hypertension    Naomi Guerrero Lorriane Neth, MD    Office Visit 1 year ago Age-related nuclear cataract of left eye    [facility] Veda Salvador MD    Office Visit    1 year ago Annual physical exam    Cabrera Luciano MD    Office Visit          Future Appointments         Provider Department Appt Notes    In 2 months Callum Cotton MD [facility], [facility], [address] [place] [address] 6 month follow up/flu shot if available care partner policy informed to pt  per the patient this is a 6 month follow up not a physical.    In 9 months Veda Salvador MD [facility] ep/ee

## 2022-11-06 DIAGNOSIS — E78.00 HYPERCHOLESTEREMIA: ICD-10-CM

## 2022-11-07 RX ORDER — ATORVASTATIN CALCIUM 10 MG/1
TABLET, FILM COATED ORAL
Qty: 90 TABLET | Refills: 1 | Status: SHIPPED | OUTPATIENT
Start: 2022-11-07

## 2022-11-25 ENCOUNTER — OFFICE VISIT (OUTPATIENT)
Dept: INTERNAL MEDICINE CLINIC | Facility: CLINIC | Age: 67
End: 2022-11-25
Payer: COMMERCIAL

## 2022-11-25 VITALS
HEIGHT: 72 IN | BODY MASS INDEX: 23.57 KG/M2 | WEIGHT: 174 LBS | DIASTOLIC BLOOD PRESSURE: 73 MMHG | SYSTOLIC BLOOD PRESSURE: 119 MMHG | HEART RATE: 65 BPM

## 2022-11-25 DIAGNOSIS — Z23 NEED FOR VACCINATION: ICD-10-CM

## 2022-11-25 DIAGNOSIS — F41.9 ANXIETY: ICD-10-CM

## 2022-11-25 DIAGNOSIS — M16.12 PRIMARY OSTEOARTHRITIS OF LEFT HIP: ICD-10-CM

## 2022-11-25 DIAGNOSIS — I10 PRIMARY HYPERTENSION: Primary | ICD-10-CM

## 2022-11-25 PROCEDURE — 3074F SYST BP LT 130 MM HG: CPT | Performed by: INTERNAL MEDICINE

## 2022-11-25 PROCEDURE — 3008F BODY MASS INDEX DOCD: CPT | Performed by: INTERNAL MEDICINE

## 2022-11-25 PROCEDURE — 3078F DIAST BP <80 MM HG: CPT | Performed by: INTERNAL MEDICINE

## 2022-11-25 PROCEDURE — 99214 OFFICE O/P EST MOD 30 MIN: CPT | Performed by: INTERNAL MEDICINE

## 2022-11-25 RX ORDER — GARLIC EXTRACT 500 MG
1 CAPSULE ORAL DAILY
COMMUNITY

## 2022-11-27 DIAGNOSIS — I10 ESSENTIAL HYPERTENSION WITH GOAL BLOOD PRESSURE LESS THAN 130/85: ICD-10-CM

## 2022-11-28 RX ORDER — AMLODIPINE BESYLATE 10 MG/1
TABLET ORAL
Qty: 90 TABLET | Refills: 3 | Status: SHIPPED | OUTPATIENT
Start: 2022-11-28

## 2023-02-13 DIAGNOSIS — E03.8 OTHER SPECIFIED HYPOTHYROIDISM: ICD-10-CM

## 2023-02-14 RX ORDER — LEVOTHYROXINE SODIUM 0.1 MG/1
TABLET ORAL
Qty: 90 TABLET | Refills: 3 | Status: SHIPPED | OUTPATIENT
Start: 2023-02-14

## 2023-02-14 NOTE — TELEPHONE ENCOUNTER
Please review. Protocol failed / No protocol.    Requested Prescriptions   Pending Prescriptions Disp Refills    LEVOTHYROXINE 100 MCG Oral Tab [Pharmacy Med Name: LEVOTHYROXINE 0.100MG (100MCG) TAB] 90 tablet 1     Sig: TAKE 1 TABLET(100 MCG) BY MOUTH BEFORE BREAKFAST       Thyroid Medication Protocol Failed - 2/13/2023  2:18 PM        Failed - TSH in past 12 months        Passed - Last TSH value is normal     Lab Results   Component Value Date    TSH 1.340 05/21/2021                 Passed - In person appointment or virtual visit in the past 12 mos or appointment in next 3 mos     Recent Outpatient Visits              2 months ago Primary hypertension    [facility]    Office Visit    7 months ago Age-related nuclear cataract of left eye    [address] [place], [place] [address]Mare MD    Office Visit    8 months ago Acute idiopathic gout involving toe of left foot    [address] [facility]    Office Visit    1 year ago Primary hypertension    [facility] Richardson [facility]    Office Visit    1 year ago Age-related nuclear cataract of left eye    [address] [place], [place] [address]Mare MD    Office Visit          Future Appointments         Provider Department Appt Notes    In 3 months Kurt Richardson MD [address], [place]     In 4 months Dylon Isabel MD [address], [address], [place] ep/ee                   Future Appointments         Provider Department Appt Notes    In 3 months Kurt Richardson MD [address], [place]     In 4 months Dylon Isabel MD [address], [address], [place] ep/ee           Recent Outpatient Visits              2 months ago Primary hypertension    [facility], Jeffrey Parker MD    Office Visit    7 months ago Age-related nuclear cataract of left eye    Ankita Baeza Chester Pellegrini, MD    Office Visit    8 months ago Acute idiopathic gout involving toe of left foot    Jeffrey Loya MD    Office Visit    1 year ago Primary hypertension    Jeffrey Loya MD    Office Visit    1 year ago Age-related nuclear cataract of left eye    Cinda Suarez, Tram Parker MD    Office Visit

## 2023-03-24 DIAGNOSIS — F41.9 ANXIETY: ICD-10-CM

## 2023-03-24 RX ORDER — PAROXETINE HYDROCHLORIDE 40 MG/1
40 TABLET, FILM COATED ORAL EVERY MORNING
Qty: 90 TABLET | Refills: 3 | Status: SHIPPED | OUTPATIENT
Start: 2023-03-24

## 2023-03-24 NOTE — TELEPHONE ENCOUNTER
Refill passed per [place] [employer] [place], [facility] protocol.      Interaction with Naproxen    Requested Prescriptions   Pending Prescriptions Disp Refills    PAROXETINE HCL 40 MG Oral Tab [Pharmacy Med Name: PAROXETINE 40MG TABLETS] 90 tablet 1     Sig: TAKE 1 TABLET(40 MG) BY MOUTH EVERY MORNING       Psychiatric Non-Scheduled (Anti-Anxiety) Passed - 3/24/2023  9:20 AM        Passed - In person appointment or virtual visit in the past 6 mos or appointment in next 3 mos     Recent Outpatient Visits              3 months ago Primary hypertension    Naomi Blanton Twyla Richters, MD    Office Visit    8 months ago Age-related nuclear cataract of left eye    Gurpreet Fletcher MD    Office Visit    10 months ago Acute idiopathic gout involving toe of left foot    Jeffrey Blanton MD    Office Visit    1 year ago Primary hypertension    Jeffrey Blanton MD    Office Visit    1 year ago Age-related nuclear cataract of left eye    Gurpreet Fletcher MD    Office Visit          Future Appointments         Provider Department Appt Notes    In 2 months MD Homar Barton [place]     In 3 months MD Homar Allen, [address], [place] ep/ee

## 2023-05-01 DIAGNOSIS — E78.00 HYPERCHOLESTEREMIA: ICD-10-CM

## 2023-05-03 RX ORDER — ATORVASTATIN CALCIUM 10 MG/1
TABLET, FILM COATED ORAL
Qty: 90 TABLET | Refills: 1 | Status: SHIPPED | OUTPATIENT
Start: 2023-05-03

## 2023-05-22 ENCOUNTER — APPOINTMENT (OUTPATIENT)
Dept: CT IMAGING | Facility: HOSPITAL | Age: 68
End: 2023-05-22
Attending: EMERGENCY MEDICINE
Payer: COMMERCIAL

## 2023-05-22 ENCOUNTER — APPOINTMENT (OUTPATIENT)
Dept: GENERAL RADIOLOGY | Facility: HOSPITAL | Age: 68
End: 2023-05-22
Attending: EMERGENCY MEDICINE
Payer: COMMERCIAL

## 2023-05-22 ENCOUNTER — HOSPITAL ENCOUNTER (EMERGENCY)
Facility: HOSPITAL | Age: 68
Discharge: HOME | End: 2023-05-22
Attending: EMERGENCY MEDICINE
Payer: COMMERCIAL

## 2023-05-22 VITALS
WEIGHT: 170 LBS | HEART RATE: 63 BPM | OXYGEN SATURATION: 94 % | HEIGHT: 72 IN | BODY MASS INDEX: 23.03 KG/M2 | TEMPERATURE: 98 F | RESPIRATION RATE: 17 BRPM | SYSTOLIC BLOOD PRESSURE: 120 MMHG | DIASTOLIC BLOOD PRESSURE: 66 MMHG

## 2023-05-22 DIAGNOSIS — M19.012 OSTEOARTHRITIS OF LEFT SHOULDER, UNSPECIFIED OSTEOARTHRITIS TYPE: Primary | ICD-10-CM

## 2023-05-22 LAB
ALBUMIN SERPL-MCNC: 3.4 G/DL (ref 3.4–5)
ALBUMIN/GLOB SERPL: 0.9 {RATIO} (ref 1–2)
ALP LIVER SERPL-CCNC: 123 U/L
ALT SERPL-CCNC: 54 U/L
ANION GAP SERPL CALC-SCNC: 6 MMOL/L (ref 0–18)
AST SERPL-CCNC: 35 U/L (ref 15–37)
BASOPHILS # BLD AUTO: 0.09 X10(3) UL (ref 0–0.2)
BASOPHILS NFR BLD AUTO: 0.9 %
BILIRUB SERPL-MCNC: 0.4 MG/DL (ref 0.1–2)
BUN BLD-MCNC: 17 MG/DL (ref 7–18)
BUN/CREAT SERPL: 17.7 (ref 10–20)
CALCIUM BLD-MCNC: 9.5 MG/DL (ref 8.5–10.1)
CHLORIDE SERPL-SCNC: 111 MMOL/L (ref 98–112)
CO2 SERPL-SCNC: 27 MMOL/L (ref 21–32)
CREAT BLD-MCNC: 0.96 MG/DL
D DIMER PPP FEU-MCNC: 1.63 UG/ML FEU (ref ?–0.7)
DEPRECATED RDW RBC AUTO: 42.9 FL (ref 35.1–46.3)
EOSINOPHIL # BLD AUTO: 0.63 X10(3) UL (ref 0–0.7)
EOSINOPHIL NFR BLD AUTO: 6.6 %
ERYTHROCYTE [DISTWIDTH] IN BLOOD BY AUTOMATED COUNT: 13 % (ref 11–15)
GFR SERPLBLD BASED ON 1.73 SQ M-ARVRAT: 85 ML/MIN/1.73M2 (ref 60–?)
GLOBULIN PLAS-MCNC: 3.8 G/DL (ref 2.8–4.4)
GLUCOSE BLD-MCNC: 126 MG/DL (ref 70–99)
HCT VFR BLD AUTO: 43.7 %
HGB BLD-MCNC: 14.3 G/DL
IMM GRANULOCYTES # BLD AUTO: 0.04 X10(3) UL (ref 0–1)
IMM GRANULOCYTES NFR BLD: 0.4 %
LYMPHOCYTES # BLD AUTO: 3.15 X10(3) UL (ref 1–4)
LYMPHOCYTES NFR BLD AUTO: 32.9 %
MCH RBC QN AUTO: 29.5 PG (ref 26–34)
MCHC RBC AUTO-ENTMCNC: 32.7 G/DL (ref 31–37)
MCV RBC AUTO: 90.1 FL
MONOCYTES # BLD AUTO: 0.75 X10(3) UL (ref 0.1–1)
MONOCYTES NFR BLD AUTO: 7.8 %
NEUTROPHILS # BLD AUTO: 4.92 X10 (3) UL (ref 1.5–7.7)
NEUTROPHILS # BLD AUTO: 4.92 X10(3) UL (ref 1.5–7.7)
NEUTROPHILS NFR BLD AUTO: 51.4 %
OSMOLALITY SERPL CALC.SUM OF ELEC: 301 MOSM/KG (ref 275–295)
PLATELET # BLD AUTO: 298 10(3)UL (ref 150–450)
POTASSIUM SERPL-SCNC: 5 MMOL/L (ref 3.5–5.1)
PROT SERPL-MCNC: 7.2 G/DL (ref 6.4–8.2)
RBC # BLD AUTO: 4.85 X10(6)UL
SODIUM SERPL-SCNC: 144 MMOL/L (ref 136–145)
TROPONIN I HIGH SENSITIVITY: 8 NG/L
WBC # BLD AUTO: 9.6 X10(3) UL (ref 4–11)

## 2023-05-22 PROCEDURE — 84484 ASSAY OF TROPONIN QUANT: CPT | Performed by: EMERGENCY MEDICINE

## 2023-05-22 PROCEDURE — 36415 COLL VENOUS BLD VENIPUNCTURE: CPT

## 2023-05-22 PROCEDURE — 80053 COMPREHEN METABOLIC PANEL: CPT | Performed by: EMERGENCY MEDICINE

## 2023-05-22 PROCEDURE — 84484 ASSAY OF TROPONIN QUANT: CPT

## 2023-05-22 PROCEDURE — 99284 EMERGENCY DEPT VISIT MOD MDM: CPT

## 2023-05-22 PROCEDURE — 85025 COMPLETE CBC W/AUTO DIFF WBC: CPT | Performed by: EMERGENCY MEDICINE

## 2023-05-22 PROCEDURE — 80053 COMPREHEN METABOLIC PANEL: CPT

## 2023-05-22 PROCEDURE — 73030 X-RAY EXAM OF SHOULDER: CPT | Performed by: EMERGENCY MEDICINE

## 2023-05-22 PROCEDURE — 93010 ELECTROCARDIOGRAM REPORT: CPT

## 2023-05-22 PROCEDURE — 93005 ELECTROCARDIOGRAM TRACING: CPT

## 2023-05-22 PROCEDURE — 85379 FIBRIN DEGRADATION QUANT: CPT | Performed by: EMERGENCY MEDICINE

## 2023-05-22 PROCEDURE — 85025 COMPLETE CBC W/AUTO DIFF WBC: CPT

## 2023-05-22 PROCEDURE — 99285 EMERGENCY DEPT VISIT HI MDM: CPT

## 2023-05-22 PROCEDURE — 71260 CT THORAX DX C+: CPT | Performed by: EMERGENCY MEDICINE

## 2023-05-22 NOTE — ED INITIAL ASSESSMENT (HPI)
Patient reports one week history of left shoulder pain radiating into chest, back and neck. Recent hip replacement in March, 2023. No trauma. No diaphoresis. No luz.  No n/v.

## 2023-05-23 ENCOUNTER — OFFICE VISIT (OUTPATIENT)
Dept: INTERNAL MEDICINE CLINIC | Facility: CLINIC | Age: 68
End: 2023-05-23

## 2023-05-23 VITALS
SYSTOLIC BLOOD PRESSURE: 126 MMHG | WEIGHT: 170 LBS | HEART RATE: 70 BPM | BODY MASS INDEX: 23.03 KG/M2 | HEIGHT: 72 IN | OXYGEN SATURATION: 97 % | DIASTOLIC BLOOD PRESSURE: 64 MMHG

## 2023-05-23 DIAGNOSIS — F41.9 ANXIETY: ICD-10-CM

## 2023-05-23 DIAGNOSIS — E78.00 HYPERCHOLESTEREMIA: ICD-10-CM

## 2023-05-23 DIAGNOSIS — Z96.642 STATUS POST LEFT HIP REPLACEMENT: ICD-10-CM

## 2023-05-23 DIAGNOSIS — R80.9 MICROALBUMINURIA: ICD-10-CM

## 2023-05-23 DIAGNOSIS — I10 PRIMARY HYPERTENSION: ICD-10-CM

## 2023-05-23 DIAGNOSIS — E03.9 ACQUIRED HYPOTHYROIDISM: ICD-10-CM

## 2023-05-23 DIAGNOSIS — M25.512 ACUTE PAIN OF LEFT SHOULDER: ICD-10-CM

## 2023-05-23 DIAGNOSIS — M1A.0720 IDIOPATHIC CHRONIC GOUT OF LEFT FOOT WITHOUT TOPHUS: ICD-10-CM

## 2023-05-23 DIAGNOSIS — K21.9 GASTROESOPHAGEAL REFLUX DISEASE WITHOUT ESOPHAGITIS: ICD-10-CM

## 2023-05-23 DIAGNOSIS — Z00.00 ANNUAL PHYSICAL EXAM: Primary | ICD-10-CM

## 2023-05-23 DIAGNOSIS — Z12.11 COLON CANCER SCREENING: ICD-10-CM

## 2023-05-23 DIAGNOSIS — Z23 NEED FOR VACCINATION: ICD-10-CM

## 2023-05-23 PROBLEM — M10.072 ACUTE IDIOPATHIC GOUT INVOLVING TOE OF LEFT FOOT: Status: RESOLVED | Noted: 2022-05-24 | Resolved: 2023-05-23

## 2023-05-23 LAB
ATRIAL RATE: 68 BPM
P AXIS: 44 DEGREES
P-R INTERVAL: 236 MS
Q-T INTERVAL: 424 MS
QRS DURATION: 96 MS
QTC CALCULATION (BEZET): 450 MS
R AXIS: -7 DEGREES
T AXIS: 45 DEGREES
VENTRICULAR RATE: 68 BPM

## 2023-05-23 PROCEDURE — 99397 PER PM REEVAL EST PAT 65+ YR: CPT | Performed by: INTERNAL MEDICINE

## 2023-05-23 PROCEDURE — 3074F SYST BP LT 130 MM HG: CPT | Performed by: INTERNAL MEDICINE

## 2023-05-23 PROCEDURE — 90471 IMMUNIZATION ADMIN: CPT | Performed by: INTERNAL MEDICINE

## 2023-05-23 PROCEDURE — 3078F DIAST BP <80 MM HG: CPT | Performed by: INTERNAL MEDICINE

## 2023-05-23 PROCEDURE — 3008F BODY MASS INDEX DOCD: CPT | Performed by: INTERNAL MEDICINE

## 2023-05-23 PROCEDURE — 99214 OFFICE O/P EST MOD 30 MIN: CPT | Performed by: INTERNAL MEDICINE

## 2023-05-23 PROCEDURE — 90750 HZV VACC RECOMBINANT IM: CPT | Performed by: INTERNAL MEDICINE

## 2023-05-30 ENCOUNTER — LAB ENCOUNTER (OUTPATIENT)
Dept: LAB | Age: 68
End: 2023-05-30
Attending: INTERNAL MEDICINE
Payer: COMMERCIAL

## 2023-05-30 DIAGNOSIS — E03.9 ACQUIRED HYPOTHYROIDISM: ICD-10-CM

## 2023-05-30 DIAGNOSIS — R80.9 MICROALBUMINURIA: ICD-10-CM

## 2023-05-30 DIAGNOSIS — E78.00 HYPERCHOLESTEREMIA: ICD-10-CM

## 2023-05-30 DIAGNOSIS — I10 PRIMARY HYPERTENSION: ICD-10-CM

## 2023-05-30 DIAGNOSIS — M1A.0720 IDIOPATHIC CHRONIC GOUT OF LEFT FOOT WITHOUT TOPHUS: ICD-10-CM

## 2023-05-30 DIAGNOSIS — Z00.00 ANNUAL PHYSICAL EXAM: ICD-10-CM

## 2023-05-30 LAB
CHOLEST SERPL-MCNC: 161 MG/DL (ref ?–200)
CREAT UR-SCNC: 343 MG/DL
FASTING PATIENT LIPID ANSWER: YES
HDLC SERPL-MCNC: 63 MG/DL (ref 40–59)
LDLC SERPL CALC-MCNC: 86 MG/DL (ref ?–100)
MICROALBUMIN UR-MCNC: 121 MG/DL
MICROALBUMIN/CREAT 24H UR-RTO: 352.8 UG/MG (ref ?–30)
NONHDLC SERPL-MCNC: 98 MG/DL (ref ?–130)
PSA SERPL-MCNC: 0.87 NG/ML (ref ?–4)
TRIGL SERPL-MCNC: 60 MG/DL (ref 30–149)
TSI SER-ACNC: 0.77 MIU/ML (ref 0.36–3.74)
URATE SERPL-MCNC: 7.2 MG/DL
VLDLC SERPL CALC-MCNC: 10 MG/DL (ref 0–30)

## 2023-05-30 PROCEDURE — 36415 COLL VENOUS BLD VENIPUNCTURE: CPT

## 2023-05-30 PROCEDURE — 84153 ASSAY OF PSA TOTAL: CPT

## 2023-05-30 PROCEDURE — 84443 ASSAY THYROID STIM HORMONE: CPT

## 2023-05-30 PROCEDURE — 80061 LIPID PANEL: CPT

## 2023-05-30 PROCEDURE — 82043 UR ALBUMIN QUANTITATIVE: CPT

## 2023-05-30 PROCEDURE — 84550 ASSAY OF BLOOD/URIC ACID: CPT

## 2023-05-30 PROCEDURE — 82570 ASSAY OF URINE CREATININE: CPT

## 2023-07-12 ENCOUNTER — OFFICE VISIT (OUTPATIENT)
Dept: OPHTHALMOLOGY | Facility: CLINIC | Age: 68
End: 2023-07-12

## 2023-07-12 DIAGNOSIS — H43.393 FLOATER, VITREOUS, BILATERAL: ICD-10-CM

## 2023-07-12 DIAGNOSIS — H25.12 AGE-RELATED NUCLEAR CATARACT OF LEFT EYE: ICD-10-CM

## 2023-07-12 DIAGNOSIS — Z96.1 PSEUDOPHAKIA, RIGHT EYE: Primary | ICD-10-CM

## 2023-07-12 PROCEDURE — 92014 COMPRE OPH EXAM EST PT 1/>: CPT | Performed by: OPHTHALMOLOGY

## 2023-07-12 NOTE — PROGRESS NOTES
Kaylene Sanz is a 77year old male. HPI:     HPI    Patient is here for yearly eye exam.  He denies vision changes. Patient thinks he had an Iritis flare up in the right eye about 2 weeks ago. He used his Pred Forte drops once and it cleared up. He is not using any drops now. Last edited by Mayank Plata O.T. on 2023  1:54 PM.        Patient History:  Past Medical History:   Diagnosis Date    Acute iritis of both eyes 2016    Anxiety state, unspecified     Cataracts, bilateral     Congenital leg length inequality     Hyperlipidemia     Recurrent iritis of both eyes 2016    Thyroid disease     Unspecified essential hypertension        Surgical History: Kaylene Sanz has a past surgical history that includes colonoscopy (); upper gi endoscopy,biopsy (); leg/ankle surgery proc unlisted (Right) (multiple reconstructive procedures); Inguinal hernia repair (Right, ); Inguinal hernia repair (Left, ); Cataract extraction, extracapsular w/ intraocular lens implant (Right, 2017) (Dr. Maddie Schrader @ [facility]); and Yag Capsulotomy - OD - Right Eye (Right, 2018) ([facility]).     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 ATORVASTATIN 10 MG Oral Tab TAKE 1 TABLET(10 MG) BY MOUTH EVERY NIGHT 90 tablet 1    PARoxetine HCl 40 MG Oral Tab Take 1 tablet (40 mg total) by mouth every morning. 90 tablet 3    LEVOTHYROXINE 100 MCG Oral Tab TAKE 1 TABLET(100 MCG) BY MOUTH BEFORE BREAKFAST 90 tablet 3    AMLODIPINE 10 MG Oral Tab TAKE 1 TABLET(10 MG) BY MOUTH DAILY 90 tablet 3    Ascorbic Acid (VITAMIN C OR) Take by mouth. GARLIC OR Take by mouth. Calcium Citrate (CITRACAL OR) Take by mouth. FIBER OR Take by mouth. Naproxen Sodium (ALEVE OR) Take by mouth. acidophilus-pectin Oral Cap Take 1 capsule by mouth daily. OMEPRAZOLE 20 MG Oral Capsule Delayed Release TAKE 1 CAPSULE(20 MG) BY MOUTH EVERY MORNING 90 capsule 3    Melatonin 5 MG Oral Tab Take by mouth. Multiple Vitamins-Minerals (CENTRUM SILVER) Oral Tab Take 1 tablet by mouth daily. L-Lysine 1000 MG Oral Tab Take 1 tablet by mouth 2 (two) times daily. Omega 3 1000 MG Oral Cap Take  by mouth daily. B Complex-C-Folic Acid (B COMPLEX + C TR) Oral Tab CR Take  by mouth 3 (three) times daily. Niacin  MG Oral Tab CR Take 0.6667 tablets (500 mg total) by mouth 3 (three) times daily.          Allergies:    Eucalyptus Oil          HIVES    Comment:Lip swelling with Airborne exposure  Terbinafine             RASH    Comment:Likely SCLE, generalized eruption    ROS:       PHYSICAL EXAM:     Base Eye Exam       Visual Acuity (Snellen - Linear)         Right Left    Dist cc 20/20 20/20 -1    Near cc 20/20 20/20      Correction: Glasses              Tonometry (Icare, 2:03 PM)         Right Left    Pressure 17 18              Pupils         Pupils    Right PERRL    Left PERRL              Visual Fields         Left Right     Full Full              Extraocular Movement         Right Left     Full, Ortho Full, Ortho              Dilation       Both eyes: 1.0% Mydriacyl and 2.5% Doyle Synephrine @ 2:03 PM Slit Lamp and Fundus Exam       Slit Lamp Exam         Right Left    Lids/Lashes Dermatochalasis, Meibomian gland dysfunction Dermatochalasis, Meibomian gland dysfunction     Conjunctiva/Sclera Temp pinguecula Temp pinguecula    Cornea Clear  trace Guttata    Anterior Chamber Deep and quiet Deep and quiet    Iris Normal Normal    Lens PC IOL with YAG  1+ Nuclear sclerosis, Trace Posterior subcapsular cataract    Vitreous Foley ring Foley ring              Fundus Exam         Right Left    Disc Good rim, Temporal crescent Good rim, Temporal crescent    C/D Ratio 0.3 0.2    Macula Normal Normal    Vessels Normal Normal    Periphery Normal Normal                  Refraction       Wearing Rx         Sphere Cylinder Axis Add    Right -3.75 +2.50 180 +3.00    Left -3.25 +1.25 180 +3.00      Type: Progressive bifocal              Manifest Refraction    Patient deferred Refraction he is happy with his current glasses. ASSESSMENT/PLAN:     Diagnoses and Plan:     Pseudophakia, right eye  No treatment. Age-related nuclear cataract of left eye  No treatment is needed on left cataract at this time. Continue with same glasses    Floater, vitreous, bilateral   There is no evidence of retinal pathology. All signs and symptoms of retinal detachment/tears explained in detail. Patient instructed to call the office if they experience increase in floaters, increase in flashes of light, loss of vision or curtain or veil effect. Will see patient in 1 year for a complete exam    No orders of the defined types were placed in this encounter.       Meds This Visit:  Requested Prescriptions      No prescriptions requested or ordered in this encounter        Follow up instructions:  Return in about 1 year (around 7/12/2024) for complete exam.    7/12/2023  Scribed by: Benita Saenz MD

## 2023-07-12 NOTE — PATIENT INSTRUCTIONS
Pseudophakia, right eye  No treatment. Age-related nuclear cataract of left eye  No treatment is needed on left cataract at this time. Continue with same glasses    Floater, vitreous, bilateral   There is no evidence of retinal pathology. All signs and symptoms of retinal detachment/tears explained in detail. Patient instructed to call the office if they experience increase in floaters, increase in flashes of light, loss of vision or curtain or veil effect.      Will see patient in 1 year for a complete exam

## 2023-08-20 DIAGNOSIS — K21.9 GASTROESOPHAGEAL REFLUX DISEASE: ICD-10-CM

## 2023-08-21 RX ORDER — OMEPRAZOLE 20 MG/1
20 CAPSULE, DELAYED RELEASE ORAL EVERY MORNING
Qty: 90 CAPSULE | Refills: 3 | Status: SHIPPED | OUTPATIENT
Start: 2023-08-21

## 2023-08-21 NOTE — TELEPHONE ENCOUNTER
Refill passed per [place] [employer], [facility] protocol.      Requested Prescriptions   Pending Prescriptions Disp Refills    OMEPRAZOLE 20 MG Oral Capsule Delayed Release [Pharmacy Med Name: OMEPRAZOLE 20MG CAPSULES] 90 capsule 3     Sig: TAKE 1 CAPSULE(20 MG) BY MOUTH EVERY MORNING       Gastrointestional Medication Protocol Passed - 8/20/2023  1:48 PM        Passed - In person appointment or virtual visit in the past 12 mos or appointment in next 3 mos     Recent Outpatient Visits              1 month ago Pseudophakia, right eye    Pina Najera MD    Office Visit    3 months ago Annual physical exam    Jeffrey Webster MD    Office Visit    8 months ago Primary hypertension    Jeffrey Webster MD    Office Visit    1 year ago Age-related nuclear cataract of left eye    Ankita Najera Jamesetta Parry, MD    Office Visit    1 year ago Acute idiopathic gout involving toe of left foot    Andrew Webster MD    Office Visit          Future Appointments         Provider Department Appt Notes    In 3 months Rodriguez Ivy MD [address] [place],[address], [address] [facility] 6 Month Follow Up    In 11 months MD Lisa Vasquez, [facility] EE [address] [facility] Visits              1 month ago Pseudophakia, right eye    Pina Najera MD    Office Visit    3 months ago Annual physical exam    Jeffrey Webster MD    Office Visit    8 months ago Primary hypertension    Jeffrey Webster MD    Office Visit    1 year ago Age-related nuclear cataract of left eye    Denys Myrtle, [place], Elson [place]ot, MD    Office Visit    1 year ago Acute idiopathic gout involving toe of left foot    Nicolasa Fan MD    Office Visit             Future Appointments         Provider Department Appt Notes    In 3 months MD Walt Suarez, [address] [place] [address] 6 Month Follow Up    In 6 months MD Eduardo Pierre [facility] [employer]

## 2023-10-06 ENCOUNTER — TELEPHONE (OUTPATIENT)
Dept: INTERNAL MEDICINE CLINIC | Facility: CLINIC | Age: 68
End: 2023-10-06

## 2023-10-07 NOTE — TELEPHONE ENCOUNTER
On call   Wife called stating pt stated he started having fevers and tested for COVID today and he tested positive  Reviewed labs and noted he had his kidney function tested in May and it was normal  Paxlovid sent to pharmacy on file  Stop statin and garlic supplement

## 2023-10-30 DIAGNOSIS — E78.00 HYPERCHOLESTEREMIA: ICD-10-CM

## 2023-10-30 NOTE — TELEPHONE ENCOUNTER
Current Outpatient Medications:       ATORVASTATIN 10 MG Oral Tab, TAKE 1 TABLET(10 MG) BY MOUTH EVERY NIGHT, Disp: 90 tablet, Rfl: 1

## 2023-10-31 RX ORDER — ATORVASTATIN CALCIUM 10 MG/1
10 TABLET, FILM COATED ORAL NIGHTLY
Qty: 90 TABLET | Refills: 3 | Status: SHIPPED | OUTPATIENT
Start: 2023-10-31

## 2023-10-31 NOTE — TELEPHONE ENCOUNTER
Refill passed per [place] [employer] protocol. Requested Prescriptions   Pending Prescriptions Disp Refills    atorvastatin 10 MG Oral Tab 90 tablet 3     Sig: Take 1 tablet (10 mg total) by mouth nightly.        Cholesterol Medication Protocol Passed - 10/30/2023 11:17 AM        Passed - ALT in past 12 months        Passed - LDL in past 12 months        Passed - Last ALT < 80     Lab Results   Component Value Date    ALT 54 2023             Passed - Last LDL < 130     Lab Results   Component Value Date    LDL 86 2023             Passed - In person appointment or virtual visit in the past 12 mos or appointment in next 3 mos     Recent Outpatient Visits              3 months ago Pseudophakia, right eye    Franklin Becker MD    Office Visit    5 months ago Annual physical exam    Jeffrey Rangel MD    Office Visit    11 months ago Primary hypertension    Jeffrey Rangel MD    Office Visit    1 year ago Age-related nuclear cataract of left eye    Ankita Ga, Alejandra Barrett MD    Office Visit    1 year ago Acute idiopathic gout involving toe of left foot    Kulwant Rangel MD    Office Visit          Future Appointments         Provider Department Appt Notes    In 4 weeks Kurt Richardson MD [address], [address] Jeffrey Gonzalez 6 Month Follow Up    In 8 months Dylon Isabel MD [address], [address], [facility]                         Recent Outpatient Visits              3 months ago Pseudophakia, right eye    [address], [address], Mare Gill MD    Office Visit    5 months ago Annual physical exam    [address], [address] [facility]    Office Visit    11 months ago Primary hypertension    Jeffrey Short MD    Office Visit    1 year ago Age-related nuclear cataract of left eye    [address] [facility]    Office Visit    1 year ago Acute idiopathic gout involving toe of left foot    Doug Beckford, Joya Vu MD    Office Visit          Future Appointments         Provider Department Appt Notes    In 4 weeks Troy Powell MD [address], Tanna Schilder, [place] 6 Month Follow Up    In 8 months Rylan Mi MD [address] [facility], [place] EP [facility] [employer]

## 2023-11-28 ENCOUNTER — OFFICE VISIT (OUTPATIENT)
Dept: INTERNAL MEDICINE CLINIC | Facility: CLINIC | Age: 68
End: 2023-11-28

## 2023-11-28 ENCOUNTER — MED REC SCAN ONLY (OUTPATIENT)
Dept: INTERNAL MEDICINE CLINIC | Facility: CLINIC | Age: 68
End: 2023-11-28

## 2023-11-28 VITALS
HEART RATE: 72 BPM | WEIGHT: 172 LBS | BODY MASS INDEX: 23.3 KG/M2 | OXYGEN SATURATION: 97 % | SYSTOLIC BLOOD PRESSURE: 117 MMHG | DIASTOLIC BLOOD PRESSURE: 63 MMHG | HEIGHT: 72 IN

## 2023-11-28 DIAGNOSIS — I10 PRIMARY HYPERTENSION: Primary | ICD-10-CM

## 2023-11-28 DIAGNOSIS — Z23 NEED FOR VACCINATION: ICD-10-CM

## 2023-11-28 DIAGNOSIS — R80.9 MICROALBUMINURIA: ICD-10-CM

## 2023-11-28 PROCEDURE — 99214 OFFICE O/P EST MOD 30 MIN: CPT | Performed by: INTERNAL MEDICINE

## 2023-11-28 PROCEDURE — 90662 IIV NO PRSV INCREASED AG IM: CPT | Performed by: INTERNAL MEDICINE

## 2023-11-28 PROCEDURE — 3008F BODY MASS INDEX DOCD: CPT | Performed by: INTERNAL MEDICINE

## 2023-11-28 PROCEDURE — 90750 HZV VACC RECOMBINANT IM: CPT | Performed by: INTERNAL MEDICINE

## 2023-11-28 PROCEDURE — 90472 IMMUNIZATION ADMIN EACH ADD: CPT | Performed by: INTERNAL MEDICINE

## 2023-11-28 PROCEDURE — 90471 IMMUNIZATION ADMIN: CPT | Performed by: INTERNAL MEDICINE

## 2023-11-28 PROCEDURE — 3074F SYST BP LT 130 MM HG: CPT | Performed by: INTERNAL MEDICINE

## 2023-11-28 PROCEDURE — 3078F DIAST BP <80 MM HG: CPT | Performed by: INTERNAL MEDICINE

## 2023-11-28 RX ORDER — AMLODIPINE BESYLATE 10 MG/1
10 TABLET ORAL DAILY
Qty: 90 TABLET | Refills: 3 | Status: SHIPPED | OUTPATIENT
Start: 2023-11-28

## 2023-12-01 ENCOUNTER — LAB ENCOUNTER (OUTPATIENT)
Dept: LAB | Age: 68
End: 2023-12-01
Attending: INTERNAL MEDICINE
Payer: COMMERCIAL

## 2023-12-01 DIAGNOSIS — R80.9 MICROALBUMINURIA: ICD-10-CM

## 2023-12-01 LAB
CREAT UR-SCNC: 174.2 MG/DL
MICROALBUMIN UR-MCNC: 32.7 MG/DL
MICROALBUMIN/CREAT 24H UR-RTO: 187.7 UG/MG (ref ?–30)

## 2023-12-01 PROCEDURE — 82043 UR ALBUMIN QUANTITATIVE: CPT

## 2023-12-01 PROCEDURE — 82570 ASSAY OF URINE CREATININE: CPT

## 2024-02-11 DIAGNOSIS — E03.8 OTHER SPECIFIED HYPOTHYROIDISM: ICD-10-CM

## 2024-02-13 RX ORDER — LEVOTHYROXINE SODIUM 0.1 MG/1
100 TABLET ORAL
Qty: 90 TABLET | Refills: 3 | Status: SHIPPED | OUTPATIENT
Start: 2024-02-13

## 2024-02-13 NOTE — TELEPHONE ENCOUNTER
Refill passed per [facility] protocol.  Requested Prescriptions   Pending Prescriptions Disp Refills    LEVOTHYROXINE 100 MCG Oral Tab [Pharmacy Med Name: LEVOTHYROXINE 0.100MG (100MCG) TAB] 90 tablet 3     Sig: TAKE 1 TABLET(100 MCG) BY MOUTH BEFORE BREAKFAST       Thyroid Medication Protocol Passed - 2/11/2024  2:25 PM        Passed - TSH in past 12 months        Passed - Last TSH value is normal     Lab Results   Component Value Date    TSH 0.774 05/30/2023                 Passed - In person appointment or virtual visit in the past 12 mos or appointment in next 3 mos     Recent Outpatient Visits              2 months ago Primary hypertension    [facility]    Office Visit    7 months ago Pseudophakia, right eye    [facility]    Office Visit    8 months ago Annual physical exam    [facility]    Office Visit    1 year ago [facility] hypertension    [facility]    Office Visit    1 year ago Age-related nuclear cataract of left eye    [facility]    [facility]          Future Appointments         Provider Department Appt Notes    In 3 months Blayne Santillan MD [facility] PX LAST 5/23/23    In 5 months Arnaldo Rausch MD [facility] EP EE YRLY                  Recent Outpatient Visits              2 months ago Primary hypertension    [facility]    Office Visit    7 months ago Pseudophakia, right eye    [facility]    Office Visit    8 months ago Annual physical exam    [facility] Mag[facility]    Office Visit    1 year ago Primary hypertension    [facility]    Office Visit    1 year ago Age-related nuclear cataract of left eye    [facility]    Office Visit          Future Appointments         Provider Department Appt Notes    In 3 months [facility]    In 5 months [facility]

## 2024-03-24 DIAGNOSIS — F41.9 ANXIETY: ICD-10-CM

## 2024-03-26 RX ORDER — PAROXETINE HYDROCHLORIDE 40 MG/1
40 TABLET, FILM COATED ORAL EVERY MORNING
Qty: 90 TABLET | Refills: 3 | Status: SHIPPED | OUTPATIENT
Start: 2024-03-26

## 2024-03-26 NOTE — TELEPHONE ENCOUNTER
Refilled per protocol.   Please review pended refill request as unable to refill due to high/very high drug interaction warning copied here;   High  Drug-Drug: PARoxetine HCl and ALEVE ORToxic effects may be increased with concurrent administration of NSAIDs and Selective Serotonin Reuptake Inhibitors. The risk of upper gastrointestinal bleeding may be increased. Patients taking both drugs concurrently should be educated about the signs and symptoms of GI bleeding.   Requested Prescriptions   Pending Prescriptions Disp Refills    PAROXETINE HCL 40 MG Oral Tab [Pharmacy Med Name: PAROXETINE 40MG TABLETS] 90 tablet 3     Sig: TAKE 1 TABLET(40 MG) BY MOUTH EVERY MORNING       Psychiatric Non-Scheduled (Anti-Anxiety) Passed - 3/24/2024  2:32 PM        Passed - In person appointment or virtual visit in the past 6 mos or appointment in next 3 mos     Recent Outpatient Visits              3 months ago [facility]    [facility]    8 months ago Pseudophakia, right eye    [facility]    Office Visit    10 months ago Annual physical exam    [facility]    Office Visit    1 year ago Primary hypertension    [facility]    Office Visit    1 year ago Age-related nuclear cataract of left eye    [facility]    [facility] Visit          Future Appointments         Provider Department Appt Notes    In 2 months [facility] 5/23/23    In 3 months [facility]               Passed - Depression Screening completed within the past 12 months             Future Appointments          Provider Department Appt Notes    In 2 months Blayne Santillan MD [facility] P[facility] LAST 5/23/23    In 3 months Arnaldo Rausch MD [facility] EP EE YRLY          Recent Outpatient Visits              3 months ago Primary hypertension    [facility]    Office Visit    8 months ago Pseudophakia, right eye    [facility]    Office Visit    10 months ago Annual physical exam    [facility]    Office Visit    1 year ago Primary hypertension    [facility] Blayne Mixon MD    Office Visit    1 year ago Age-related nuclear cataract of left eye    [facility] Arnaldo Rausch MD    Office Visit

## 2024-06-07 ENCOUNTER — OFFICE VISIT (OUTPATIENT)
Dept: INTERNAL MEDICINE CLINIC | Facility: CLINIC | Age: 69
End: 2024-06-07
Payer: COMMERCIAL

## 2024-06-07 VITALS
DIASTOLIC BLOOD PRESSURE: 76 MMHG | SYSTOLIC BLOOD PRESSURE: 128 MMHG | OXYGEN SATURATION: 96 % | BODY MASS INDEX: 24.24 KG/M2 | WEIGHT: 179 LBS | HEART RATE: 70 BPM | HEIGHT: 72 IN

## 2024-06-07 DIAGNOSIS — I10 PRIMARY HYPERTENSION: ICD-10-CM

## 2024-06-07 DIAGNOSIS — E03.9 ACQUIRED HYPOTHYROIDISM: ICD-10-CM

## 2024-06-07 DIAGNOSIS — K21.9 GASTROESOPHAGEAL REFLUX DISEASE WITHOUT ESOPHAGITIS: ICD-10-CM

## 2024-06-07 DIAGNOSIS — M1A.0720 IDIOPATHIC CHRONIC GOUT OF LEFT FOOT WITHOUT TOPHUS: ICD-10-CM

## 2024-06-07 DIAGNOSIS — K58.2 IRRITABLE BOWEL SYNDROME WITH BOTH CONSTIPATION AND DIARRHEA: ICD-10-CM

## 2024-06-07 DIAGNOSIS — R80.9 MICROALBUMINURIA: ICD-10-CM

## 2024-06-07 DIAGNOSIS — F41.9 ANXIETY: ICD-10-CM

## 2024-06-07 DIAGNOSIS — Z00.00 ANNUAL PHYSICAL EXAM: Primary | ICD-10-CM

## 2024-06-07 DIAGNOSIS — Z12.11 COLON CANCER SCREENING: ICD-10-CM

## 2024-06-07 DIAGNOSIS — E78.00 HYPERCHOLESTEREMIA: ICD-10-CM

## 2024-06-07 PROCEDURE — 99397 PER PM REEVAL EST PAT 65+ YR: CPT | Performed by: INTERNAL MEDICINE

## 2024-06-07 PROCEDURE — 3008F BODY MASS INDEX DOCD: CPT | Performed by: INTERNAL MEDICINE

## 2024-06-07 PROCEDURE — 3074F SYST BP LT 130 MM HG: CPT | Performed by: INTERNAL MEDICINE

## 2024-06-07 PROCEDURE — 3078F DIAST BP <80 MM HG: CPT | Performed by: INTERNAL MEDICINE

## 2024-06-07 PROCEDURE — 99213 OFFICE O/P EST LOW 20 MIN: CPT | Performed by: INTERNAL MEDICINE

## 2024-06-07 NOTE — PROGRESS NOTES
Patient ID: Shawn Vargas is a 69 year old male.  Chief Complaint   Patient presents with    Physical     Semi annual Physical         HISTORY OF PRESENT ILLNESS:   HPI  Patient presents for above.  Here for annual physical.     History of high blood pressure on medications.  Readings from home average 130/80.  He denies any chest pain or shortness of breath.  He is compliant with medications.    History of hypercholesterolemia on dual therapy.  Diet is unchanged.  Last lipid panel was normal.    History of hypothyroidism on medications.  Compliant without any known side effects.     Had left hip replacement done in 2022.  He is following with orthopedic surgery.     History of gout of left big toe.  First time it occurred was approximately 2 years ago.  This was the only time it occurred.  Not on any chronic medications for this.     History of irritable bowel syndrome.  Has flares up frequently.  Was on Linzess but no longer taking.  We will get alternating patterns of diarrhea and constipation.  Gets spasms in his ascending colon area.     History of acid reflux on medications.  Has a gastroenterologist for which he sees this for.  Controlled with medications.     History of anxiety on paroxetine.  Well-controlled.  Does not need dose increased.     Last colonoscopy was in 2013 with repeat to be done in 2023.  Urinates 1-2 times nightly.    Review of Systems   Constitutional: Negative.    HENT: Negative.     Eyes: Negative.    Respiratory: Negative.     Cardiovascular: Negative.    Gastrointestinal: Negative.    Endocrine: Negative.    Genitourinary: Negative.    Musculoskeletal:  Positive for arthralgias.   Skin: Negative.    Allergic/Immunologic: Negative.    Neurological: Negative.    Hematological: Negative.    Psychiatric/Behavioral: Negative.       MEDICAL HISTORY:     Past Medical History:    Acute iritis of both eyes    Anxiety state, unspecified    Cataracts, bilateral    Congenital leg length  inequality    Hyperlipidemia    Recurrent iritis of both eyes    Thyroid disease    Unspecified essential hypertension       Past Surgical History:   Procedure Laterality Date    Cataract extraction extracapsular w/ intraocular lens implantation Right 09/26/2017    Dr. Tobin @ [facility]    Colonoscopy  2013    Inguinal hernia repair Right 1963    Inguinal hernia repair Left 1970    Leg/ankle surgery proc unlisted Right     multiple reconstructive procedures    Upper gi endoscopy,biopsy  2013    Yag capsulotomy - od - right eye Right 04/07/2018    [facility]         Current Outpatient Medications:     PARoxetine HCl 40 MG Oral Tab, Take 1 tablet (40 mg total) by mouth every morning., Disp: 90 tablet, Rfl: 3    levothyroxine 100 MCG Oral Tab, Take 1 tablet (100 mcg total) by mouth before breakfast., Disp: 90 tablet, Rfl: 3    amLODIPine 10 MG Oral Tab, Take 1 tablet (10 mg total) by mouth daily., Disp: 90 tablet, Rfl: 3    atorvastatin 10 MG Oral Tab, Take 1 tablet (10 mg total) by mouth nightly., Disp: 90 tablet, Rfl: 3    omeprazole 20 MG Oral Capsule Delayed Release, Take 1 capsule (20 mg total) by mouth every morning., Disp: 90 capsule, Rfl: 3    cholecalciferol 1000 UNITS Oral Cap, Take 1 capsule (1,000 Units total) by mouth daily., Disp: , Rfl:     Ascorbic Acid (VITAMIN C OR), Take by mouth., Disp: , Rfl:     GARLIC OR, Take by mouth., Disp: , Rfl:     Calcium Citrate (CITRACAL OR), Take by mouth., Disp: , Rfl:     FIBER OR, Take by mouth., Disp: , Rfl:     Naproxen Sodium (ALEVE OR), Take by mouth., Disp: , Rfl:     acidophilus-pectin Oral Cap, Take 1 capsule by mouth daily., Disp: , Rfl:     Melatonin 5 MG Oral Tab, Take by mouth., Disp: , Rfl:     Multiple Vitamins-Minerals (CENTRUM SILVER) Oral Tab, Take 1 tablet by mouth daily., Disp: , Rfl:     L-Lysine 1000 MG Oral Tab, Take 1 tablet by mouth 2 (two) times daily., Disp: , Rfl:     Omega 3 1000 MG Oral Cap, Take  by mouth daily., Disp: , Rfl:     B Complex-C-Folic Acid  (B COMPLEX + C TR) Oral Tab CR, Take  by mouth 3 (three) times daily., Disp: , Rfl:     Allergies:  Allergies   Allergen Reactions    Eucalyptus Oil HIVES     Lip swelling with Airborne exposure    Terbinafine RASH     Likely SCLE, generalized eruption       Social History     Socioeconomic History    Marital status:      Spouse name: Not on file    Number of children: 3    Years of education: Not on file    Highest education level: Not on file   Occupational History    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   Tobacco Use    Smoking status: Former     Current packs/day: 0.00     Types: Cigarettes     Quit date: 1983     Years since quittin.4     Passive exposure: Never    Smokeless tobacco: Never   Vaping Use    Vaping status: Never Used   Substance and Sexual Activity    Alcohol use: Yes     Comment: beer occasionally    Drug use: No    Sexual activity: Not on file   Other Topics Concern     Service Not Asked    Blood Transfusions Not Asked    Caffeine Concern Yes     Comment: coffee, 2 cups    Occupational Exposure Not Asked    Hobby Hazards Not Asked    Sleep Concern Not Asked    Stress Concern Not Asked    Weight Concern Not Asked    Special Diet Not Asked    Back Care Not Asked    Exercise Not Asked    Bike Helmet Not Asked    Seat Belt Not Asked    Self-Exams Not Asked    Grew up on a farm Not Asked    History of tanning Not Asked    Outdoor occupation Not Asked    Pt has a pacemaker Not Asked    Pt has a defibrillator Not Asked    Reaction to local anesthetic Not Asked   Social History Narrative    Not on file     Social Determinants of Health     Financial Resource Strain: Not on file   Food Insecurity: Not on file   Transportation Needs: Not on file   Physical Activity: Not on file   Stress: Not on file   Social Connections: Not on file   Housing Stability: Low Risk  (10/25/2021)    [facility]     Housing Stability     Mortgage Payment Concerns?: Not on file     Number of Places Lived in the Last Year: Not on file     Unstable Housing?: Not on file           PHYSICAL EXAM:     Vitals:    06/07/24 1323   BP: 128/76   Pulse: 70   SpO2: 96%   Weight: 179 lb (81.2 kg)   Height: 6' (1.829 m)       Body mass index is 24.28 kg/m².    Physical Exam  Constitutional:       Appearance: Normal appearance. He is well-developed.   HENT:      Head: Normocephalic.      Right Ear: Tympanic membrane, ear canal and external ear normal. There is no impacted cerumen.      Left Ear: Tympanic membrane, ear canal and external ear normal. There is no impacted cerumen.   Eyes:      General: No scleral icterus.     Pupils: Pupils are equal, round, and reactive to light.   Neck:      Vascular: No JVD.   Cardiovascular:      Rate and Rhythm: Normal rate and regular rhythm.      Pulses: Normal pulses.      Heart sounds: Normal heart sounds. No murmur heard.  Pulmonary:      Effort: Pulmonary effort is normal. No respiratory distress.      Breath sounds: Normal breath sounds. No stridor. No wheezing, rhonchi or rales.   Chest:      Chest wall: No tenderness.   Abdominal:      General: Abdomen is flat. Bowel sounds are normal. There is no distension.      Palpations: Abdomen is soft.      Tenderness: There is no abdominal tenderness. There is no guarding or rebound.   Musculoskeletal:      Left shoulder: Decreased range of motion.      Cervical back: Normal range of motion.      Comments: BKA of right leg.   Lymphadenopathy:      Cervical: No cervical adenopathy.   Skin:     General: Skin is warm.   Neurological:      General: No focal deficit present.      Mental Status: He is alert.      Cranial Nerves: No cranial nerve deficit.   Psychiatric:         Mood and Affect: Mood normal.         Behavior: Behavior normal.         ASSESSMENT/PLAN:   1. Annual physical exam  CBC With Differential With Platelet; Future  Comp Metabolic Panel (14);  Future  Lipid Panel; Future  TSH W Reflex To Free T4; Future  PSA Total, Diagnostic; Future    2. Primary hypertension  Microalb/Creat Ratio, Random Urine; Future  Well controlled.    3. Hypercholesteremia  Comp Metabolic Panel (14); Future  Lipid Panel; Future    4. Acquired hypothyroidism  TSH W Reflex To Free T4; Future    5. Anxiety  Stable.  Continue paroxetine.    6. Gastroesophageal reflux disease without esophagitis  Stable on medications.    7. Irritable bowel syndrome with both constipation and diarrhea  Stable.    8. Idiopathic chronic gout of left foot without tophus  Uric Acid; Future    9. Microalbuminuria  Microalb/Creat Ratio, Random Urine; Future    10. Colon cancer screening  Repeat colonoscopy in 2033.    Return in about 6 months (around 12/7/2024) for Routine Follow-Up.    This note was prepared using Dragon Medical voice recognition dictation software. As a result errors may occur. When identified these errors have been corrected. While every attempt is made to correct errors during dictation discrepancies may still exist.    Blayne Santillan MD  6/7/2024

## 2024-06-07 NOTE — PATIENT INSTRUCTIONS
Prevention Guidelines, Men Ages 65 and Older  Screening tests and vaccines are an important part of managing your health.A screening test is done to find possible disorders or diseases in people who don't have any symptoms. The goal is to find a disease early so lifestyle changes can be made and you can be watched more closely to reduce the risk of disease, or to detect it early enough to treat it most effectively. Screening tests are not considered diagnostic, but are used to determine if more testing is needed.  Health counseling is essential, too. Below are guidelines for these, for men ages 65 and older. Talk with your healthcare provider to make sure you’re up-to-date on what you need.  Screening Who needs it How often   Abdominal aortic aneurysm Men ages 65 to 75 who have ever smoked 1 ultrasound   Alcohol misuse All men in this age group At routine exams   Blood pressure All men in this age group Yearly checkup if your blood pressure is normal  Normal blood pressure is less than 120/80 mm Hg  If your blood pressure reading is higher than normal, follow the advice of your healthcare provider   Colorectal cancer All men in this age group Flexible sigmoidoscopy every 5 years, or colonoscopy every 10 years, or double-contrast barium enema every 5 years; yearly fecal occult blood test or fecal immunochemical test; or a stool DNA test as often as your healthcare provider advises; talk with your healthcare provider about which tests are best for you and when you no longer need colonoscopies (generally after age 75)   Depression All men in this age group At routine exams   Type 2 diabetes or prediabetes All men beginning at age 45 and men without symptoms at any age who are overweight or obese and have 1 or more other risk factors for diabetes At least every 3 years (yearly if your blood sugar has already begun to rise)   Type 2 diabetes All men with prediabetes Every year   Hepatitis C Men at increased risk for  infection - talk with your healthcare provider At routine exams   High cholesterol or triglycerides All men in this age group At least every 5 years   HIV Men at increased risk for infection - talk with your healthcare provider At routine exams   Lung cancer Adults ages 55 to 80 who have smoked Yearly screening in smokers with 30 pack-year history of smoking or who quit within 15 years   Obesity All men in this age group At routine exams   Prostate cancer All men in this age group, talk to healthcare provider about risks and benefits of digital rectal exam (TERRY) and prostate-specific antigen (PSA) screening1 At routine exams   Syphilis Men at increased risk for infection - talk with your healthcare provider At routine exams   Tuberculosis Men at increased risk for infection - talk with your healthcare provider Ask your healthcare provider   Vision All men in this age group Every 1 to 2 years; if you have a chronic health condition, ask your healthcare provider if you needs exams more often   Vaccine Who needs it How often   Chickenpox (varicella) All men in this age group who have no record of this infection or vaccine 2 doses; second dose should be given at least 4 weeks after the first dose   Hepatitis A Men at increased risk for infection - talk with your healthcare provider 2 doses given at least 6 months apart   Hepatitis B Men at increased risk for infection - talk with your healthcare provider 3 doses over 6 months; second dose should be given 1 month after the first dose; the third dose should be given at least 2 months after the second dose and at least 4 months after the first dose   Haemophilus influenzae Type B (HIB) Men at increased risk for infection - talk with your healthcare provider 1 to 3 doses   Influenza (flu) All men in this age group  Once a year   Meningococcal Men at increased risk for infection - talk with your healthcare provider 1 or more doses   Pneumococcal conjugate vaccine (PCV13) and  pneumococcal polysaccharide vaccine (PPSV23) All men in this age group 1 dose of each vaccine   Tetanus/diphtheria/  pertussis (Td/Tdap) booster All men in this age group Td every 10 years, or Tdap if you will have contact with a child younger than 12 months old   Zoster All men in this age group 1 dose   Counseling Who needs it How often   Diet and exercise Men who are overweight or obese When diagnosed, and then at routine exams   Fall prevention (exercise, vitamin D supplements) All men in this age group At routine exams   Sexually transmitted infection Men at increased risk for infection - talk with your healthcare provider At routine exams   Use of daily aspirin Men ages 45 to 79 at risk for cardiovascular health problems At routine exams   Use of tobacco and the health effects it can cause All men in this age group Every visit   [address] [place] [address] [facility]   Date Last Reviewed: 2/1/2017  © 2940-9422 [employer]. [address] [place] [id number]. All rights reserved. This information is not intended as a substitute for professional medical care. Always follow your healthcare professional's instructions.

## 2024-06-10 ENCOUNTER — LAB ENCOUNTER (OUTPATIENT)
Dept: LAB | Age: 69
End: 2024-06-10
Attending: INTERNAL MEDICINE
Payer: COMMERCIAL

## 2024-06-10 DIAGNOSIS — I10 PRIMARY HYPERTENSION: ICD-10-CM

## 2024-06-10 DIAGNOSIS — E78.00 HYPERCHOLESTEREMIA: ICD-10-CM

## 2024-06-10 DIAGNOSIS — E03.9 ACQUIRED HYPOTHYROIDISM: ICD-10-CM

## 2024-06-10 DIAGNOSIS — R80.9 MICROALBUMINURIA: ICD-10-CM

## 2024-06-10 DIAGNOSIS — Z00.00 ANNUAL PHYSICAL EXAM: ICD-10-CM

## 2024-06-10 DIAGNOSIS — M1A.0720 IDIOPATHIC CHRONIC GOUT OF LEFT FOOT WITHOUT TOPHUS: ICD-10-CM

## 2024-06-10 LAB
ALBUMIN SERPL-MCNC: 4.5 G/DL (ref 3.2–4.8)
ALBUMIN/GLOB SERPL: 1.6 {RATIO} (ref 1–2)
ALP LIVER SERPL-CCNC: 116 U/L
ALT SERPL-CCNC: 42 U/L
ANION GAP SERPL CALC-SCNC: 8 MMOL/L (ref 0–18)
AST SERPL-CCNC: 37 U/L (ref ?–34)
BASOPHILS # BLD AUTO: 0.1 X10(3) UL (ref 0–0.2)
BASOPHILS NFR BLD AUTO: 1.3 %
BILIRUB SERPL-MCNC: 0.5 MG/DL (ref 0.2–1.1)
BUN BLD-MCNC: 14 MG/DL (ref 9–23)
BUN/CREAT SERPL: 14 (ref 10–20)
CALCIUM BLD-MCNC: 9.9 MG/DL (ref 8.7–10.4)
CHLORIDE SERPL-SCNC: 107 MMOL/L (ref 98–112)
CHOLEST SERPL-MCNC: 168 MG/DL (ref ?–200)
CO2 SERPL-SCNC: 29 MMOL/L (ref 21–32)
CREAT BLD-MCNC: 1 MG/DL
CREAT UR-SCNC: 189.6 MG/DL
DEPRECATED RDW RBC AUTO: 45.3 FL (ref 35.1–46.3)
EGFRCR SERPLBLD CKD-EPI 2021: 80 ML/MIN/1.73M2 (ref 60–?)
EOSINOPHIL # BLD AUTO: 0.4 X10(3) UL (ref 0–0.7)
EOSINOPHIL NFR BLD AUTO: 5.1 %
ERYTHROCYTE [DISTWIDTH] IN BLOOD BY AUTOMATED COUNT: 13.3 % (ref 11–15)
FASTING PATIENT LIPID ANSWER: YES
FASTING STATUS PATIENT QL REPORTED: YES
GLOBULIN PLAS-MCNC: 2.9 G/DL (ref 2–3.5)
GLUCOSE BLD-MCNC: 102 MG/DL (ref 70–99)
HCT VFR BLD AUTO: 46.5 %
HDLC SERPL-MCNC: 67 MG/DL (ref 40–59)
HGB BLD-MCNC: 15.5 G/DL
IMM GRANULOCYTES # BLD AUTO: 0.06 X10(3) UL (ref 0–1)
IMM GRANULOCYTES NFR BLD: 0.8 %
LDLC SERPL CALC-MCNC: 90 MG/DL (ref ?–100)
LYMPHOCYTES # BLD AUTO: 2.91 X10(3) UL (ref 1–4)
LYMPHOCYTES NFR BLD AUTO: 36.9 %
MCH RBC QN AUTO: 30.7 PG (ref 26–34)
MCHC RBC AUTO-ENTMCNC: 33.3 G/DL (ref 31–37)
MCV RBC AUTO: 92.1 FL
MICROALBUMIN UR-MCNC: 6.5 MG/DL
MICROALBUMIN/CREAT 24H UR-RTO: 34.3 UG/MG (ref ?–30)
MONOCYTES # BLD AUTO: 0.68 X10(3) UL (ref 0.1–1)
MONOCYTES NFR BLD AUTO: 8.6 %
NEUTROPHILS # BLD AUTO: 3.73 X10 (3) UL (ref 1.5–7.7)
NEUTROPHILS # BLD AUTO: 3.73 X10(3) UL (ref 1.5–7.7)
NEUTROPHILS NFR BLD AUTO: 47.3 %
NONHDLC SERPL-MCNC: 101 MG/DL (ref ?–130)
OSMOLALITY SERPL CALC.SUM OF ELEC: 299 MOSM/KG (ref 275–295)
PLATELET # BLD AUTO: 318 10(3)UL (ref 150–450)
POTASSIUM SERPL-SCNC: 4.6 MMOL/L (ref 3.5–5.1)
PROT SERPL-MCNC: 7.4 G/DL (ref 5.7–8.2)
PSA SERPL-MCNC: 3.95 NG/ML (ref ?–4)
RBC # BLD AUTO: 5.05 X10(6)UL
SODIUM SERPL-SCNC: 144 MMOL/L (ref 136–145)
TRIGL SERPL-MCNC: 52 MG/DL (ref 30–149)
TSI SER-ACNC: 1.51 MIU/ML (ref 0.55–4.78)
URATE SERPL-MCNC: 7.8 MG/DL
VLDLC SERPL CALC-MCNC: 8 MG/DL (ref 0–30)
WBC # BLD AUTO: 7.9 X10(3) UL (ref 4–11)

## 2024-06-10 PROCEDURE — 36415 COLL VENOUS BLD VENIPUNCTURE: CPT

## 2024-06-10 PROCEDURE — 84550 ASSAY OF BLOOD/URIC ACID: CPT

## 2024-06-10 PROCEDURE — 82570 ASSAY OF URINE CREATININE: CPT

## 2024-06-10 PROCEDURE — 84153 ASSAY OF PSA TOTAL: CPT

## 2024-06-10 PROCEDURE — 85025 COMPLETE CBC W/AUTO DIFF WBC: CPT

## 2024-06-10 PROCEDURE — 82043 UR ALBUMIN QUANTITATIVE: CPT

## 2024-06-10 PROCEDURE — 80053 COMPREHEN METABOLIC PANEL: CPT

## 2024-06-10 PROCEDURE — 84443 ASSAY THYROID STIM HORMONE: CPT

## 2024-06-10 PROCEDURE — 80061 LIPID PANEL: CPT

## 2024-07-16 ENCOUNTER — OFFICE VISIT (OUTPATIENT)
Dept: OPHTHALMOLOGY | Facility: CLINIC | Age: 69
End: 2024-07-16

## 2024-07-16 DIAGNOSIS — Z96.1 PSEUDOPHAKIA, RIGHT EYE: Primary | ICD-10-CM

## 2024-07-16 DIAGNOSIS — H43.393 FLOATER, VITREOUS, BILATERAL: ICD-10-CM

## 2024-07-16 DIAGNOSIS — H25.12 AGE-RELATED NUCLEAR CATARACT OF LEFT EYE: ICD-10-CM

## 2024-07-16 DIAGNOSIS — H20.9 IRITIS OF RIGHT EYE: ICD-10-CM

## 2024-07-16 PROCEDURE — 92015 DETERMINE REFRACTIVE STATE: CPT | Performed by: OPHTHALMOLOGY

## 2024-07-16 PROCEDURE — 92014 COMPRE OPH EXAM EST PT 1/>: CPT | Performed by: OPHTHALMOLOGY

## 2024-07-16 RX ORDER — PREDNISOLONE ACETATE 10 MG/ML
SUSPENSION/ DROPS OPHTHALMIC
Qty: 5 ML | Refills: 2 | Status: SHIPPED | OUTPATIENT
Start: 2024-07-16

## 2024-07-16 NOTE — PROGRESS NOTES
Shawn Vargas is a 69 year old male.    HPI:     HPI    EP/here for a complete eye exam.   States as he have H/O iritis both eyes started noticing left eye pain, on the upper and side of the eye ball, and redness,denies light sensitivity or changes in the vision and started with the Prednisolone eye drops in both the eyes, used once yesterday and felt improvement in the pain.   States a week ago post sinus infection the vision with the right eye was blurry and it improved back to normal now.   C/O floaters in the vision of both the eyes for a long time now.   Wearing progressive bifocals that are about 1-2 years old and requesting a new glasses prescription on today's visit.     Last edited by Amrita Wade OT on 2024  3:17 PM.        Patient History:  Past Medical History:    Acute iritis of both eyes    Anxiety state, unspecified    Cataracts, bilateral    Congenital leg length inequality    Hyperlipidemia    Recurrent iritis of both eyes    Thyroid disease    Unspecified essential hypertension       Surgical History: Shawn Vargas has a past surgical history that includes colonoscopy (); upper gi endoscopy,biopsy (); leg/ankle surgery proc unlisted (Right) (multiple reconstructive procedures); Inguinal hernia repair (Right, ); Inguinal hernia repair (Left, ); Cataract extraction, extracapsular w/ intraocular lens implant (Right, 2017) (Dr. Tobin @ [facility]); and Yag Capsulotomy - OD - Right Eye (Right, 2018) ([facility]).    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, [facility]    Housing Stability       Medications:  Current Outpatient Medications   Medication Sig Dispense Refill    prednisoLONE (PRED FORTE) 1 % Ophthalmic Suspension Use 1 drop in the right eye 4 times a day for 5 days, 3 times a day for 5 days, 2 times a day for 5 days, once a day for 5 days then discontinue 5 mL 2    PARoxetine HCl 40 MG Oral Tab Take 1 tablet (40 mg total) by mouth every morning. 90 tablet 3    levothyroxine 100 MCG Oral Tab Take 1 tablet (100 mcg total) by mouth before breakfast. 90 tablet 3    amLODIPine 10 MG Oral Tab Take 1 tablet (10 mg total) by mouth daily. 90 tablet 3    atorvastatin 10 MG Oral Tab Take 1 tablet (10 mg total) by mouth nightly. 90 tablet 3    omeprazole 20 MG Oral Capsule Delayed Release Take 1 capsule (20 mg total) by mouth every morning. 90 capsule 3    cholecalciferol 1000 UNITS Oral Cap Take 1 capsule (1,000 Units total) by mouth daily.      Ascorbic Acid (VITAMIN C OR) Take by mouth.      GARLIC OR Take by mouth.      Calcium Citrate (CITRACAL OR) Take by mouth.      FIBER OR Take by mouth.      Naproxen Sodium (ALEVE OR) Take by mouth.      acidophilus-pectin Oral Cap Take 1 capsule by mouth daily.      Melatonin 5 MG Oral Tab Take by mouth.      Multiple Vitamins-Minerals (CENTRUM SILVER) Oral Tab Take 1 tablet by mouth daily.      L-Lysine 1000 MG Oral Tab Take 1 tablet by mouth 2 (two) times daily.      Omega 3 1000 MG Oral Cap Take  by mouth daily.      B Complex-C-Folic Acid (B COMPLEX + C TR) Oral Tab CR Take  by mouth 3 (three) times daily.         Allergies:  Allergies   Allergen  Reactions    Eucalyptus Oil HIVES     Lip swelling with Airborne exposure    Terbinafine RASH     Likely SCLE, generalized eruption       ROS:       PHYSICAL EXAM:     Base Eye Exam       Visual Acuity (Snellen - Linear)         Right Left    Dist cc 20/20 20/20    Near cc 20/20 20/20      Correction: Glasses              Tonometry (Applanation, 2:58 PM)         Right Left    Pressure 19 20              Pupils         Pupils    Right PERRL    Left PERRL              Visual Fields         Left Right     Full Full              Extraocular Movement         Right Left     Full Full              Dilation       Both eyes: 1.0% Mydriacyl and 2.5% Doyle Synephrine @ 2:58 PM                  Slit Lamp and Fundus Exam       Slit Lamp Exam         Right Left    Lids/Lashes Dermatochalasis, Meibomian gland dysfunction Dermatochalasis, Meibomian gland dysfunction     Conjunctiva/Sclera Temp pinguecula Temp pinguecula    Cornea Clear  trace Guttata    Anterior Chamber Deep and trace cell Deep and quiet    Iris Normal Normal    Lens PC IOL with YAG  1+ Nuclear sclerosis, Trace Posterior subcapsular cataract    Vitreous Foley ring Foley ring              Fundus Exam         Right Left    Disc Good rim, Temporal crescent Good rim, Temporal crescent    C/D Ratio 0.3 0.2    Macula Normal Normal    Vessels Normal Normal    Periphery Normal Normal                  Refraction       Wearing Rx         Sphere Cylinder Axis Add    Right -3.75 +2.50 180 +3.00    Left -3.25 +1.25 180 +3.00      Age: 1yrs    Type: Progressive bifocal              Manifest Refraction         Sphere Cylinder [place] Dist [place] Add Near VA    Right -3.75 +2.50 180 20/20 +3.00 20/20    Left -3.25 +1.50 180 20/20 +3.00 20/20              Final Rx         Sphere Cylinder [place] Dist VA Add Near VA    Right -3.75 +2.50 180 20/20 +3.00 20/20    Left -3.25 +1.50 180 20/20 +3.00 20/20      Type: Progressive bifocal                     ASSESSMENT/PLAN:     Diagnoses and Plan:      Pseudophakia, right eye  No treatment.     New glasses Rx given today, update as needed    Age-related nuclear cataract of left eye  No treatment is needed on left cataract at this time.    New glasses Rx given today, update as needed    Floater, vitreous, bilateral   There is no evidence of retinal pathology.  All signs and symptoms of retinal detachment/tears explained in detail.    Patient instructed to call the office if they experience increase in floaters, increase in flashes of light, loss of vision or curtain or veil effect.       Iritis of right eye  Discussed with patient that iritis can reoccur.  Told patient to watch for symptoms of pain, redness, light sensitivity and blurry vision.  Discussed if these symptoms are present, patient should call the office and schedule an appointment    Start Pred Forte in the right eye 4 times a day for 5 days, 3 times a day for 5 days, 2 times a day for 5 days, once a day for 5 days then discontinue    No orders of the defined types were placed in this encounter.      Meds This Visit:  Requested Prescriptions     Signed Prescriptions Disp Refills    prednisoLONE (PRED FORTE) 1 % Ophthalmic Suspension 5 mL 2     Sig: Use 1 drop in the right eye 4 times a day for 5 days, 3 times a day for 5 days, 2 times a day for 5 days, once a day for 5 days then discontinue        Follow up instructions:  Return in about 1 year (around 7/16/2025) for complete exam.    7/16/2024  Scribed by: Arnaldo Ruasch MD

## 2024-07-16 NOTE — PATIENT INSTRUCTIONS
Pseudophakia, right eye  No treatment.     New glasses Rx given today, update as needed    Age-related nuclear cataract of left eye  No treatment is needed on left cataract at this time.    New glasses Rx given today, update as needed    Floater, vitreous, bilateral   There is no evidence of retinal pathology.  All signs and symptoms of retinal detachment/tears explained in detail.    Patient instructed to call the office if they experience increase in floaters, increase in flashes of light, loss of vision or curtain or veil effect.       Iritis of right eye  Discussed with patient that iritis can reoccur.  Told patient to watch for symptoms of pain, redness, light sensitivity and blurry vision.  Discussed if these symptoms are present, patient should call the office and schedule an appointment    Start Pred Forte in the right eye 4 times a day for 5 days, 3 times a day for 5 days, 2 times a day for 5 days, once a day for 5 days then discontinue

## 2024-07-16 NOTE — ASSESSMENT & PLAN NOTE
Discussed with patient that iritis can reoccur.  Told patient to watch for symptoms of pain, redness, light sensitivity and blurry vision.  Discussed if these symptoms are present, patient should call the office and schedule an appointment    Start Pred Forte in the right eye 4 times a day for 5 days, 3 times a day for 5 days, 2 times a day for 5 days, once a day for 5 days then discontinue

## 2024-07-16 NOTE — ASSESSMENT & PLAN NOTE
No treatment is needed on left cataract at this time.    New glasses Rx given today, update as needed

## 2024-08-07 LAB — AMB EXT COVID-19 RESULT: DETECTED

## 2024-08-09 ENCOUNTER — NURSE TRIAGE (OUTPATIENT)
Dept: INTERNAL MEDICINE CLINIC | Facility: CLINIC | Age: 69
End: 2024-08-09

## 2024-08-09 NOTE — TELEPHONE ENCOUNTER
Patient contacted (name and  of patient verified). Dr. Santillan's message and prescription details reviewed. Patient verbalizes understanding; denies further questions and agrees with plan of care.

## 2024-08-09 NOTE — TELEPHONE ENCOUNTER
Action Requested: Summary for Provider     []  Critical Lab, Recommendations Needed  [] Need Additional Advice  []   FYI    [x]   Need Orders  [] Need Medications Sent to Pharmacy  []  Other     SUMMARY: Patient's wife called, and put patient on the phone.  They returned 2024 from cruise to [facility] and [place].  Had cold symptoms.  They took Covid test, he was positive, she was negative.  His symptoms are cough, runny nose, scratchy throat.  Had fever 100 F yesterday but afebrile today.    Denies chest pain, wheezing or shortness of breath.  Was treated for Covid with Paxlovid 10/06/2023.  Creatinine 6/10/2024 normal at 1.00.  Asking to get Paxlovid script.  No appointments available.  Dr Santillan, please advise?    Reason for call: Acute Positive for Covid, requesting Paxlovid.  Onset: Positive 2024    Patient called (identified name and ),   No audible cough or wheeze noted.    Reason for Disposition   HIGH RISK patient (e.g., weak immune system, age > 64 years, obesity with BMI of 30 or higher, pregnant, chronic lung disease or other chronic medical condition) and COVID symptoms (e.g., cough, fever)  (Exceptions: Already seen by doctor or NP/PA and no new or worsening symptoms.)    Protocols used: Coronavirus (COVID-19) Diagnosed or [id number]

## 2024-08-16 DIAGNOSIS — K21.9 GASTROESOPHAGEAL REFLUX DISEASE: ICD-10-CM

## 2024-08-20 NOTE — TELEPHONE ENCOUNTER
Refill passed per [facility] protocol.  Requested Prescriptions   Pending Prescriptions Disp Refills    OMEPRAZOLE 20 MG Oral Capsule Delayed Release [Pharmacy Med Name: OMEPRAZOLE 20MG CAPSULES] 90 capsule 3     Sig: TAKE 1 CAPSULE(20 MG) BY MOUTH EVERY MORNING       Gastrointestional Medication Protocol Passed - 8/16/2024  5:55 PM        Passed - In person appointment or virtual visit in the past 12 mos or appointment in next 3 mos     Recent Outpatient Visits              1 month ago [facility]    [facility]    2 months ago [facility]    [facility]    8 months ago [facility] hypertension    [facility]    [facility]    1 year ago Pseudophakia, right [facility]    [facility]    1 year ago Annual physical exam    [facility]          Future Appointments         Provider Department Appt Notes    In 3 months Blayne Santillan MD [facility] 6 MONTH F/U

## 2024-10-01 ENCOUNTER — TELEPHONE (OUTPATIENT)
Dept: INTERNAL MEDICINE CLINIC | Facility: CLINIC | Age: 69
End: 2024-10-01

## 2024-10-01 NOTE — TELEPHONE ENCOUNTER
Patient dropped off parking placard form for Dr Santillan.  Please call once completed for .  Placed in provider mail bin

## 2024-11-09 DIAGNOSIS — E78.00 HYPERCHOLESTEREMIA: ICD-10-CM

## 2024-11-13 RX ORDER — ATORVASTATIN CALCIUM 10 MG/1
10 TABLET, FILM COATED ORAL NIGHTLY
Qty: 90 TABLET | Refills: 3 | Status: SHIPPED | OUTPATIENT
Start: 2024-11-13

## 2024-11-13 NOTE — TELEPHONE ENCOUNTER
Refill Per Protocol     Requested Prescriptions   Pending Prescriptions Disp Refills    ATORVASTATIN 10 MG Oral Tab [Pharmacy Med Name: ATORVASTATIN 10MG TABLETS] 90 tablet 3     Sig: TAKE 1 TABLET(10 MG) BY MOUTH EVERY NIGHT       Cholesterol Medication Protocol Passed - 11/13/2024  2:09 PM        Passed - ALT < 80     Lab Results   Component Value Date    ALT 42 06/10/2024             Passed - ALT resulted within past year        Passed - Lipid panel within past 12 months     Lab Results   Component Value Date    CHOLEST 168 06/10/2024    TRIG 52 06/10/2024    HDL 67 (H) 06/10/2024    LDL 90 06/10/2024    VLDL 8 06/10/2024    NONHDLC 101 06/10/2024             Passed - In person appointment or virtual visit in the past 12 mos or appointment in next 3 mos     Recent Outpatient Visits              4 months ago [facility]    [facility]    5 months ago Annual physical exam    [facility] Blayne Mixon MD    Office Visit    11 months ago Primary hypertension    [facility]    Office Visit    1 year ago Pseudophakia, right eye    [facility] Arnaldo Avila MD    Office Visit    1 year ago Annual physical exam    [facility] Blayne Mixon MD    Office Visit          Future Appointments         Provider Department Appt Notes    In 3 weeks [facility]                           Future Appointments         Provider Department Appt Notes    In 3 weeks [facility]          Recent Outpatient Visits              4 months ago Pseudophakia, [facility] eye    [facility]  Arnaldo Rausch MD    Office Visit    5 months ago Annual physical exam    [facility], Blayne Mixon MD    Office Visit    11 months ago Primary hypertension    [facility], Blayne Mixon MD    Office Visit    1 year ago Pseudophakia, right eye    [facility] Arnaldo Rausch MD    Office Visit    1 year ago Annual physical exam    [facility], Blayne Mixon MD    Office Visit

## 2024-11-25 NOTE — ED INITIAL ASSESSMENT (HPI)
--------------  DISCHARGE REVIEW    Payor: [facility] [employer]  Subscriber #:  [id number]  Authorization Number: [id number]    Admit date: 11/20/24  Admit time:  10:56 AM  Discharge Date: 11/23/2024  2:51 PM     Admitting Physician:   Attending Physician:  No att. providers found  Primary Care Physician: No primary care provider on file.          Discharge Summary Notes        Discharge Summary signed by John Olivares DO at 11/23/2024  3:33 PM       Author: John Olivares DO Specialty: HOSPITALIST Author Type: Physician    Filed: 11/23/2024  3:33 PM Date of Service: 11/23/2024  3:29 PM Status: Signed    : John Olivares DO (Physician)           General Medicine Discharge Summary     Patient ID:  Robin Oconnor  60 year old  1/23/1964    Admit date: 11/20/2024    Discharge date and time: 11/23/2024  2:51 PM     Attending Physician: John Olivares DO     Consults: IP CONSULT TO GENERAL SURGERY  IP CONSULT TO INFECTIOUS DISEASE    Primary Care Physician: No primary care provider on file.     Reason for admission: Rectal pain perianal abscess    Risk For Readmission: Low    Discharge Diagnoses: Perianal abscess [K61.0]  See Additional Discharge Diagnoses in Hospital Course    Discharged Condition: good    Follow-up with labs/images appointments:   Close follow-up with primary care provider was recommended 1 was given to him at discharge  -Follow-up with GI provider was also recommended as patient needs a colonoscopy to rule out inflammatory bowel disease  Follow up with surgical provider also suggested      Exam  Gen: No acute distress  Pulm: Lungs clear, normal respiratory effort  CV: Heart with regular rate and rhythm  Abd: Abdomen soft,   EXT: no edema     HPI:   Per Dr. Saucedo    Patient is a 60 year old male with PMH sig for psoriasis not on medication, hx yumiko-anal abscess 2021 that was drained (tx at [place]) here w 3 days severe pain similar to 2021 episode. Progressively worsening- localized  Rash to rt side of abd and groin, states blisters and itches. to perianal region no radiation denies fevers or other focal sx. No known hx DM, not on immunosuppressives does have a long standing hx psoriasis. Labs/ CT as noted below.         Hospital Course:     Admitted for recurrent perianal abscess ultimately underwent I&D in the operating room 11/22 operating room cultures revealed E. coli and Klebsiella patient was treated with Zosyn throughout the hospital stay and was ultimately discharged on oral antibiotics, will complete 10 days of Augmentin.  Neurology was pending at the time of discharge he will need close follow-up with his primary care provider and will need to see GI for an outpatient colonoscopy to rule out inflammatory bowel disease    Operative Procedures: Procedure(s) (LRB):  RECTAL EXAM UNDER ANESTHESIA AND INCISION AND DRAINAGE OF PERIANAL ABSCESS, AND PERIANAL BIOPSIES (N/A)  ANAL ABSCESS IRRIGATION & DEBRIDEMENT (N/A)     Imaging: No results found.    Disposition: home    Activity: activity as tolerated  Diet: regular diet  Wound Care: keep wound clean and dry  Code Status: Full Code  O2: none    Home Medication Changes: See list below    Med list     Medication List        START taking these medications      acetaminophen 500 MG Tabs  Commonly known as: Tylenol Extra Strength  Take 2 tablets (1,000 mg total) by mouth every 4 (four) hours as needed for Pain.     amoxicillin clavulanate 875-125 MG Tabs  Commonly known as: Augmentin  Take 1 tablet by mouth 2 (two) times daily for 10 days.     Ibuprofen 200 MG Caps  Commonly known as: Advil  Take 1 capsule (200 mg total) by mouth every 6 (six) hours as needed.     oxyCODONE 5 MG Tabs  Take 1 tablet (5 mg total) by mouth every 4 (four) hours as needed for Pain.            CONTINUE taking these medications      Esomeprazole Magnesium 20 MG Cpdr  Commonly known as: NEXIUM     Neuriva Chew     omega-3 fatty acids 1000 MG Caps  Commonly known as: Fish Oil     ONE A DAY MEN 50 PLUS OR               Where to Get  Your Medications        These medications were sent to [id number] - [place], [place] - [address] [address] [phone], [phone]  [address] [address] [address]      Phone: [phone]   acetaminophen 500 MG Tabs  amoxicillin clavulanate 875-125 MG Tabs  Ibuprofen 200 MG Caps  oxyCODONE 5 MG Tabs         FU   Follow-up Information       [facility]. Schedule an appointment as soon as possible for a visit in 2 week(s).    Why: post operative check  Contact information:  [address]  [facility] [id number] [phone]               Zechariah Dick MD Follow up in 2 week(s).    Specialty: HOSPITALIST  Contact information:  [address]  [facility] [id number] [phone]                             DC instructions:      Other Discharge Instructions:         POST-OPERATIVE INSTRUCTIONS FOR ANORECTAL SURGERY    OBTAIN THE FOLLOWING FROM THE DRUGSTORE    PAIN  MEDICATION - A narcotic pain prescription may be provided, if not, ibuprofen (Advil)/acetaminophen (Tylenol) can be used to control pain and can be less constipating.  DO NOT exceed 4000 mg acetaminophen in 24 hours or 3200 mg ibuprofen in 24 hours.    METAMUCIL or similar fiber supplement is recommended on a daily basis.    COLACE or MIRALAX is recommended on a daily basis for 2 weeks to avoid hard bowel movements if taking prescription narcotics    SPECIAL INSTRUCTIONS    Remove the external gauze later in the day or during your first shower/bath.    On occasion a dissolvable foam (Gelfoam™) or gauze (Surgicel™) is used in the anal canal. This material will pass spontaneously often turning brown in color. Flush it down the toilet.    Avoiding straining or sitting on the toilet for long periods of time or heavy lifting especially the first day after surgery.  The increased pressure can aggravate swelling and bleeding.    Slight bleeding and drainage is usual after this procedure.  Report “excessive” bleeding or  passage of clots to the office.  Use non-cotton gauze, sanitary pads or minipads as needed for bleeding and drainage.     Warm showers or baths are recommended 2 to 3 times per day or as needed in the post- operative period for discomfort and to keep area clean.    You can purchase a hand-held shower sprayer, bidet, sitz bath, or squirt bottle to keep the tissues clean in the yumiko-anal area after bowel movements and as needed.    Avoid a hot shower immediately after surgery since the sedation used during procedure may precipitate light-headedness or fainting.    Resume your regular diet.                                                       Report severe constipation or diarrhea to the office.  Contact the office immediately if you are unable to urinate or if you have fever or chills.    Do Not Use enemas or suppositories after surgery unless specifically instructed by the office.     Contact the office the following business day after surgery to inform us of your progress and to make your follow-up appointment.                                                                              Do not drive/operate heavy machinery while you are taking narcotic pain medication or if your pain is too severe to allow you to react appropriately.    A small amount of bloody drainage can occur for several days and sometime weeks depending on the nature and severity of the surgical procedure    Please call the office at [phone] if you have any questions or concerns. Thank you for the privilege to be part of your care team.       Ina Beasley MD  [facility] - General Surgery   [address] [place] [address]  p [phone]    You need to establish with a primary care provider to complete a colonoscopy once you have healed             I reconciled current and discharge medications on day of discharge, discussed changes with patient and noted changes above.       Total Time Coordinating Care: 35  minutes    Patient had opportunity to ask questions and state understand and agree with therapeutic plan as outlined    Thank You,    John Olivares DO   Hospitalist with [facility]         Electronically signed by John Olivares DO on 11/23/2024  3:33 PM         REVIEWER COMMENTS

## 2024-12-02 ENCOUNTER — MED REC SCAN ONLY (OUTPATIENT)
Dept: INTERNAL MEDICINE CLINIC | Facility: CLINIC | Age: 69
End: 2024-12-02

## 2024-12-02 DIAGNOSIS — I10 PRIMARY HYPERTENSION: ICD-10-CM

## 2024-12-05 RX ORDER — AMLODIPINE BESYLATE 10 MG/1
10 TABLET ORAL DAILY
Qty: 90 TABLET | Refills: 3 | Status: SHIPPED | OUTPATIENT
Start: 2024-12-05

## 2024-12-05 NOTE — TELEPHONE ENCOUNTER
Refill passed per [facility] protocol.  Requested Prescriptions   Pending Prescriptions Disp Refills    AMLODIPINE 10 MG Oral Tab [Pharmacy Med Name: AMLODIPINE BESYLATE 10MG TABLETS] 90 tablet 3     Sig: TAKE 1 TABLET(10 MG) BY MOUTH DAILY       Hypertension Medications Protocol Passed - 12/5/2024  9:39 AM        Passed - CMP or BMP in past 12 months        Passed - Last BP reading less than 140/90     BP Readings from Last 1 Encounters:   06/07/24 128/76               Passed - In person appointment or virtual visit in the past 12 mos or appointment in next 3 mos     Recent Outpatient Visits              4 months [facility]    6 [facility]    1 year ago [facility]    [facility]    1 year ago Pseudophakia[facility]    [facility]    1 year ago Annual physical exam    [facility]    [facility]          Future Appointments         Provider Department Appt Notes    Tomorrow Blayne Santillan MD [facility] 6 MONTH F/U                    Passed - EGFRCR or GFRNAA > 50     GFR Evaluation  EGFRCR: 80 , resulted on 6/10/2024             Recent Outpatient Visits              4 months ago Pseudophakia, right eye    [facility]    [facility]    6 months ago Annual physical exam    [facility]    Office Visit    1 year ago Primary hypertension    [facility]    Office Visit    1 year ago Pseudophakia, [facility]    Office Visit    1 year ago [facility]    [facility]          Future Appointments         Provider Department Appt Notes    Tomorrow Blayne Santillan MD [facility] 6 MONTH F/U

## 2024-12-06 ENCOUNTER — OFFICE VISIT (OUTPATIENT)
Dept: INTERNAL MEDICINE CLINIC | Facility: CLINIC | Age: 69
End: 2024-12-06
Payer: COMMERCIAL

## 2024-12-06 VITALS
HEART RATE: 72 BPM | HEIGHT: 72 IN | BODY MASS INDEX: 23.68 KG/M2 | SYSTOLIC BLOOD PRESSURE: 130 MMHG | DIASTOLIC BLOOD PRESSURE: 60 MMHG | WEIGHT: 174.81 LBS

## 2024-12-06 DIAGNOSIS — F41.9 ANXIETY: ICD-10-CM

## 2024-12-06 DIAGNOSIS — Z23 NEED FOR VACCINATION: ICD-10-CM

## 2024-12-06 DIAGNOSIS — I10 PRIMARY HYPERTENSION: Primary | ICD-10-CM

## 2024-12-06 PROCEDURE — 90662 IIV NO PRSV INCREASED AG IM: CPT | Performed by: INTERNAL MEDICINE

## 2024-12-06 PROCEDURE — 3008F BODY MASS INDEX DOCD: CPT | Performed by: INTERNAL MEDICINE

## 2024-12-06 PROCEDURE — 90471 IMMUNIZATION ADMIN: CPT | Performed by: INTERNAL MEDICINE

## 2024-12-06 PROCEDURE — 3075F SYST BP GE 130 - 139MM HG: CPT | Performed by: INTERNAL MEDICINE

## 2024-12-06 PROCEDURE — 99214 OFFICE O/P EST MOD 30 MIN: CPT | Performed by: INTERNAL MEDICINE

## 2024-12-06 PROCEDURE — 3078F DIAST BP <80 MM HG: CPT | Performed by: INTERNAL MEDICINE

## 2024-12-06 NOTE — PROGRESS NOTES
Patient ID: Shawn Vargas is a 69 year old male.  Chief Complaint   Patient presents with    Follow - Up        HISTORY OF PRESENT ILLNESS:   HPI  Patient presents for above.  Here for 6-month follow-up.    History of high blood pressure on medications.  Readings from home average 130/80.  He denies any chest pain or shortness of breath.  He is compliant with medications.    History of anxiety on paroxetine.  Well-controlled.  Does not need dose increased.    Labs 6 months ago were normal.    He would like his influenza vaccination.    Review of Systems  Ten point review of systems otherwise negative with the exception of HPI and assessment and plan.    MEDICAL HISTORY:     Past Medical History:    Acute iritis of both eyes    Anxiety state, unspecified    Cataracts, bilateral    Congenital leg length inequality    Hyperlipidemia    Recurrent iritis of both eyes    Thyroid disease    Unspecified essential hypertension       Past Surgical History:   Procedure Laterality Date    Cataract extraction extracapsular w/ intraocular lens implantation Right 09/26/2017    Dr. Tobin @ [facility]    Colonoscopy  2013    Inguinal hernia repair Right 1963    Inguinal hernia repair Left 1970    Leg/ankle surgery proc unlisted Right     multiple reconstructive procedures    Upper gi endoscopy,biopsy  2013    Yag capsulotomy - od - right eye Right 04/07/2018    [facility]         Current Outpatient Medications:     amLODIPine 10 MG Oral Tab, Take 1 tablet (10 mg total) by mouth daily., Disp: 90 tablet, Rfl: 3    atorvastatin 10 MG Oral Tab, Take 1 tablet (10 mg total) by mouth nightly., Disp: 90 tablet, Rfl: 3    omeprazole 20 MG Oral Capsule Delayed Release, Take 1 capsule (20 mg total) by mouth every morning., Disp: 90 capsule, Rfl: 3    prednisoLONE (PRED FORTE) 1 % Ophthalmic Suspension, Use 1 drop in the right eye 4 times a day for 5 days, 3 times a day for 5 days, 2 times a day for 5 days, once a day for 5 days then discontinue, Disp:  5 mL, Rfl: 2    PARoxetine HCl 40 MG Oral Tab, Take 1 tablet (40 mg total) by mouth every morning., Disp: 90 tablet, Rfl: 3    levothyroxine 100 MCG Oral Tab, Take 1 tablet (100 mcg total) by mouth before breakfast., Disp: 90 tablet, Rfl: 3    cholecalciferol 1000 UNITS Oral Cap, Take 1 capsule (1,000 Units total) by mouth daily., Disp: , Rfl:     Ascorbic Acid (VITAMIN C OR), Take by mouth., Disp: , Rfl:     GARLIC OR, Take by mouth., Disp: , Rfl:     Calcium Citrate (CITRACAL OR), Take by mouth., Disp: , Rfl:     FIBER OR, Take by mouth., Disp: , Rfl:     Naproxen Sodium (ALEVE OR), Take by mouth., Disp: , Rfl:     acidophilus-pectin Oral Cap, Take 1 capsule by mouth daily., Disp: , Rfl:     Melatonin 5 MG Oral Tab, Take by mouth., Disp: , Rfl:     Multiple Vitamins-Minerals (CENTRUM SILVER) Oral Tab, Take 1 tablet by mouth daily., Disp: , Rfl:     L-Lysine 1000 MG Oral Tab, Take 1 tablet by mouth 2 (two) times daily., Disp: , Rfl:     Omega 3 1000 MG Oral Cap, Take  by mouth daily., Disp: , Rfl:     B Complex-C-Folic Acid (B COMPLEX + C TR) Oral Tab CR, Take  by mouth 3 (three) times daily., Disp: , Rfl:     Allergies:Allergies[1]    Social History     Socioeconomic History    Marital status:      Spouse name: Not on file    Number of children: 3    Years of education: Not on file    Highest education level: Not on file   Occupational History    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   Tobacco Use    Smoking status: Former     Current packs/day: 0.00     Types: Cigarettes     Quit date: 1983     Years since quittin.9     Passive exposure: Never    Smokeless tobacco: Never   Vaping Use    Vaping status: Never Used   Substance and Sexual Activity    Alcohol use: Yes     Comment: beer occasionally    Drug use: No    Sexual activity: Not on file   Other Topics Concern     Service Not Asked    Blood Transfusions Not Asked    Caffeine Concern Yes     Comment: coffee, 2 cups     Occupational Exposure Not Asked    Hobby Hazards Not Asked    Sleep Concern Not Asked    Stress Concern Not Asked    Weight Concern Not Asked    Special Diet Not Asked    Back Care Not Asked    Exercise Not Asked    Bike Helmet Not Asked    Seat Belt Not Asked    Self-Exams Not Asked    Grew up on a farm Not Asked    History of tanning Not Asked    Outdoor occupation Not Asked    Pt has a pacemaker Not Asked    Pt has a defibrillator Not Asked    Reaction to local anesthetic Not Asked   Social History Narrative    Not on file     Social Drivers of Health     Financial Resource Strain: Not on file   Food Insecurity: Not on file   Transportation Needs: Not on file   Physical Activity: Not on file   Stress: Not on file   Social Connections: Not on file   Housing Stability: Low Risk  (10/25/2021)    [facility]    Housing Stability     Mortgage Payment Concerns?: Not on file     Number of Places Lived in the Last Year: Not on file     Unstable Housing?: Not on file           PHYSICAL EXAM:     Vitals:    12/06/24 1331   BP: 130/60   Pulse: 72   Weight: 174 lb 12.8 oz (79.3 kg)   Height: 6' (1.829 m)       Body mass index is 23.71 kg/m².    Physical Exam  Constitutional:       Appearance: Normal appearance.   Eyes:      General: No scleral icterus.  Cardiovascular:      Rate and Rhythm: Normal rate and regular rhythm.      Pulses: Normal pulses.      Heart sounds: Normal heart sounds. No murmur heard.  Pulmonary:      Effort: Pulmonary effort is normal. No respiratory distress.      Breath sounds: Normal breath sounds. No stridor. No wheezing or rhonchi.   Abdominal:      General: Abdomen is flat. Bowel sounds are normal. There is no distension.      Palpations: Abdomen is soft. There is no mass.      Tenderness: There is no abdominal tenderness.   Neurological:      Mental Status: He is alert.   Psychiatric:         Mood and Affect: Mood normal.          Behavior: Behavior normal.           ASSESSMENT/PLAN:   1. Primary hypertension  Well-controlled.  Continue medications.  Low-salt diet.    2. Anxiety  Stable on paroxetine.    3. Need for vaccination  Fluzone High Dose  65 years and older [71393]    Return in about 6 months (around 6/6/2025) for Complete physical.    This note was prepared using Dragon Medical voice recognition dictation software. As a result errors may occur. When identified these errors have been corrected. While every attempt is made to correct errors during dictation discrepancies may still exist.    Blayne Santillan MD  12/6/2024       [1]   Allergies  Allergen Reactions    Eucalyptus Oil HIVES     Lip swelling with Airborne exposure    Terbinafine RASH     Likely SCLE, generalized eruption

## 2025-02-06 DIAGNOSIS — E03.8 OTHER SPECIFIED HYPOTHYROIDISM: ICD-10-CM

## 2025-02-09 RX ORDER — LEVOTHYROXINE SODIUM 100 UG/1
100 TABLET ORAL
Qty: 90 TABLET | Refills: 3 | Status: SHIPPED | OUTPATIENT
Start: 2025-02-09

## 2025-02-09 NOTE — TELEPHONE ENCOUNTER
Refill passed per [facility] protocol.    Requested Prescriptions   Pending Prescriptions Disp Refills    LEVOTHYROXINE 100 MCG Oral Tab [Pharmacy Med Name: LEVOTHYROXINE 0.100MG (100MCG) TAB] 90 tablet 3     Sig: TAKE 1 TABLET(100 MCG) BY MOUTH BEFORE BREAKFAST       Thyroid Medication Protocol Passed - 2/9/2025 12:53 PM        Passed - TSH in past 12 months        Passed - Last TSH value is normal     Lab Results   Component Value Date    TSH 1.513 06/10/2024                 Passed - In person appointment or virtual visit in the past 12 mos or appointment in next 3 mos     Recent Outpatient Visits              2 months ago Primary hypertension    [facility]    Office Visit    6 months ago Pseudophakia, right eye    [facility] Arnaldo Avila MD    Office Visit    8 months ago Annual physical exam    [facility] Blayne Mixon MD    Office Visit    1 [facility]    [facility] Visit    1 year ago Pseudophakia, right eye    [facility] Arnaldo Avila MD    Office Visit          Future Appointments         Provider Department Appt Notes    In 3 months Blayne Santillan MD [facility] 1ST MEDICARE PX                    Passed - Medication is active on med list             Future Appointments         Provider Department Appt Notes    In 3 months Blayne Santillan MD [facility] 1ST MEDICARE PX            Recent Outpatient Visits              2 months ago Primary hypertension    [facility] Blayne Mixon MD    Office Visit    6 months ago Pseudophakia, right eye    [facility]  Arnaldo Rausch MD    Office Visit    8 months ago Annual physical exam    [facility], Blayne Mixon MD    Office Visit    1 year ago Primary hypertension    [facility], Blayne Mixon MD    Office Visit    1 year ago Pseudophakia, right eye    [facility] [place] Arnaldo Rausch MD    Office Visit

## 2025-03-12 DIAGNOSIS — F41.9 ANXIETY: ICD-10-CM

## 2025-03-14 RX ORDER — PAROXETINE 40 MG/1
40 TABLET, FILM COATED ORAL EVERY MORNING
Qty: 90 TABLET | Refills: 3 | Status: SHIPPED | OUTPATIENT
Start: 2025-03-14

## 2025-03-14 NOTE — TELEPHONE ENCOUNTER
Refill passed per [facility] protocol but not refilled due to high level interaction warning with Aleve.    Requested Prescriptions   Pending Prescriptions Disp Refills    PAROXETINE HCL 40 MG Oral Tab [Pharmacy Med Name: PAROXETINE 40MG TABLETS] 90 tablet 3     Sig: TAKE 1 TABLET(40 MG) BY MOUTH EVERY MORNING       Psychiatric Non-Scheduled (Anti-Anxiety) Passed - 3/14/2025 10:37 AM        Passed - In person appointment or virtual visit in the past 6 mos or appointment in next 3 mos     Recent Outpatient Visits              3 months [facility]    [facility]    8 months [facility]    9 months ago [facility]    [facility]    1 year ago [facility]    [facility] Visit    1 year ago Pseudophakia, right eye    [facility]          Future Appointments         Provider Department Appt Notes    In 2 months Blayne Santillan MD [facility] 1ST MEDICARE PX                    Passed - Depression Screening completed within the past 12 months        Passed - Medication is active on med list             [unfilled]      [unfilled]

## 2025-06-04 ENCOUNTER — TELEPHONE (OUTPATIENT)
Dept: INTERNAL MEDICINE CLINIC | Facility: CLINIC | Age: 70
End: 2025-06-04

## 2025-06-04 RX ORDER — IBUPROFEN 200 MG
400 TABLET ORAL EVERY 8 HOURS PRN
COMMUNITY

## 2025-06-04 RX ORDER — MAGNESIUM 200 MG
1 TABLET ORAL NIGHTLY
COMMUNITY

## 2025-06-04 NOTE — TELEPHONE ENCOUNTER
COMPREHENSIVE MEDICATION REVIEW         Shawn Vargas MRN [id number]    1955 PCP Blayne Santillan MD     Comments: Medication history completed by Ambulatory Clinic Pharmacist over the phone on 25. Patient has upcoming AWV with PCP on 25.     After thorough medication review, 11 discrepancies have been identified and corrected on patient's medication list. See updated list below:     Outpatient Encounter Medications as of 2025   Medication Sig    Wheat Dextrin (BENEFIBER OR) Take 2 capsules by mouth 2 (two) times daily.    NON FORMULARY True Calm - 1 capsule by mouth at bedtime.    Magnesium 200 MG Oral Tab Take 1 tablet (200 mg total) by mouth at bedtime.    Apoaequorin (PREVAGEN OR) Take 1 tablet by mouth in the morning.    ibuprofen 200 MG Oral Tab Take 2 tablets (400 mg total) by mouth every 8 (eight) hours as needed for Pain.    PAROXETINE HCL 40 MG Oral Tab TAKE 1 TABLET(40 MG) BY MOUTH EVERY MORNING    levothyroxine 100 MCG Oral Tab Take 1 tablet (100 mcg total) by mouth before breakfast.    amLODIPine 10 MG Oral Tab Take 1 tablet (10 mg total) by mouth daily.    atorvastatin 10 MG Oral Tab Take 1 tablet (10 mg total) by mouth nightly.    omeprazole 20 MG Oral Capsule Delayed Release Take 1 capsule (20 mg total) by mouth every morning.    GARLIC OR Take 1 capsule by mouth 2 (two) times daily.    Calcium Citrate (CITRACAL OR) Take 2 tablets by mouth daily.    acidophilus-pectin Oral Cap Take 1 capsule by mouth daily.    Multiple Vitamins-Minerals (CENTRUM SILVER) Oral Tab Take 1 tablet by mouth daily.    L-Lysine 1000 MG Oral Tab Take 1 tablet by mouth daily.    Omega 3 1000 MG Oral Cap Take 2 capsules by mouth in the morning and 1 capsule in the evening.    B Complex-C-Folic Acid (B COMPLEX + C TR) Oral Tab CR Take 2 tablets by mouth in the morning and 1 tablet in the evening.    cholecalciferol 1000 UNITS Oral Cap Take 1 capsule (1,000 Units total) by mouth daily. During the winter  months.     Medication Assessment:   Reviewed all medications in detail with patient including dose, indication, timing of administration, monitoring parameters, and potential side effects of medications.     Patient reports taking amlodipine 10 mg daily and atorvastatin 10 mg nightly as prescribed. Did recommend patient monitor his blood pressure 2-3 times weekly and bring readings in to all MD appointments for review. Did provide education and stressed the importance of taking medication just like prescribed to get the most benefit. Patient denies forgetting or missing medication doses and denies any questions or concerns with medications at this time.     Thank you,    Maura Alvarez, PharmD, 6/4/2025, 9:23 AM

## 2025-06-06 ENCOUNTER — LAB ENCOUNTER (OUTPATIENT)
Dept: LAB | Age: 70
End: 2025-06-06
Attending: INTERNAL MEDICINE
Payer: COMMERCIAL

## 2025-06-06 ENCOUNTER — OFFICE VISIT (OUTPATIENT)
Dept: INTERNAL MEDICINE CLINIC | Facility: CLINIC | Age: 70
End: 2025-06-06
Payer: MEDICARE

## 2025-06-06 VITALS
WEIGHT: 167 LBS | HEIGHT: 72 IN | TEMPERATURE: 97 F | HEART RATE: 69 BPM | OXYGEN SATURATION: 96 % | RESPIRATION RATE: 16 BRPM | DIASTOLIC BLOOD PRESSURE: 62 MMHG | BODY MASS INDEX: 22.62 KG/M2 | SYSTOLIC BLOOD PRESSURE: 146 MMHG

## 2025-06-06 DIAGNOSIS — E03.9 ACQUIRED HYPOTHYROIDISM: ICD-10-CM

## 2025-06-06 DIAGNOSIS — I10 PRIMARY HYPERTENSION: ICD-10-CM

## 2025-06-06 DIAGNOSIS — Z12.11 COLON CANCER SCREENING: ICD-10-CM

## 2025-06-06 DIAGNOSIS — Z00.00 ENCOUNTER FOR ANNUAL HEALTH EXAMINATION: Primary | ICD-10-CM

## 2025-06-06 DIAGNOSIS — R80.9 MICROALBUMINURIA: ICD-10-CM

## 2025-06-06 DIAGNOSIS — M1A.0720 IDIOPATHIC CHRONIC GOUT OF LEFT FOOT WITHOUT TOPHUS: ICD-10-CM

## 2025-06-06 DIAGNOSIS — K21.9 GASTROESOPHAGEAL REFLUX DISEASE WITHOUT ESOPHAGITIS: ICD-10-CM

## 2025-06-06 DIAGNOSIS — F41.9 ANXIETY: ICD-10-CM

## 2025-06-06 DIAGNOSIS — E78.00 HYPERCHOLESTEREMIA: ICD-10-CM

## 2025-06-06 DIAGNOSIS — K58.2 IRRITABLE BOWEL SYNDROME WITH BOTH CONSTIPATION AND DIARRHEA: ICD-10-CM

## 2025-06-06 DIAGNOSIS — Z96.642 STATUS POST LEFT HIP REPLACEMENT: ICD-10-CM

## 2025-06-06 DIAGNOSIS — Z00.00 ENCOUNTER FOR ANNUAL HEALTH EXAMINATION: ICD-10-CM

## 2025-06-06 PROBLEM — M16.12 PRIMARY OSTEOARTHRITIS OF LEFT HIP: Status: RESOLVED | Noted: 2022-11-25 | Resolved: 2025-06-06

## 2025-06-06 PROBLEM — K57.30 DIVERTICULOSIS OF COLON: Status: ACTIVE | Noted: 2025-06-06

## 2025-06-06 LAB
ALBUMIN SERPL-MCNC: 4.7 G/DL (ref 3.2–4.8)
ALBUMIN/GLOB SERPL: 2 {RATIO} (ref 1–2)
ALP LIVER SERPL-CCNC: 109 U/L (ref 45–117)
ALT SERPL-CCNC: 38 U/L (ref 10–49)
ANION GAP SERPL CALC-SCNC: 11 MMOL/L (ref 0–18)
AST SERPL-CCNC: 29 U/L (ref ?–34)
BASOPHILS # BLD AUTO: 0.09 X10(3) UL (ref 0–0.2)
BASOPHILS NFR BLD AUTO: 1 %
BILIRUB SERPL-MCNC: 0.6 MG/DL (ref 0.2–1.1)
BUN BLD-MCNC: 18 MG/DL (ref 9–23)
BUN/CREAT SERPL: 18.2 (ref 10–20)
CALCIUM BLD-MCNC: 10.3 MG/DL (ref 8.7–10.4)
CHLORIDE SERPL-SCNC: 102 MMOL/L (ref 98–112)
CHOLEST SERPL-MCNC: 184 MG/DL (ref ?–200)
CO2 SERPL-SCNC: 28 MMOL/L (ref 21–32)
CREAT BLD-MCNC: 0.99 MG/DL (ref 0.7–1.3)
CREAT UR-SCNC: 136.9 MG/DL
DEPRECATED RDW RBC AUTO: 45.6 FL (ref 35.1–46.3)
EGFRCR SERPLBLD CKD-EPI 2021: 81 ML/MIN/1.73M2 (ref 60–?)
EOSINOPHIL # BLD AUTO: 0.36 X10(3) UL (ref 0–0.7)
EOSINOPHIL NFR BLD AUTO: 4.2 %
ERYTHROCYTE [DISTWIDTH] IN BLOOD BY AUTOMATED COUNT: 13.3 % (ref 11–15)
FASTING PATIENT LIPID ANSWER: NO
FASTING STATUS PATIENT QL REPORTED: NO
GLOBULIN PLAS-MCNC: 2.4 G/DL (ref 2–3.5)
GLUCOSE BLD-MCNC: 92 MG/DL (ref 70–99)
HCT VFR BLD AUTO: 45.3 % (ref 39–53)
HDLC SERPL-MCNC: 74 MG/DL (ref 40–59)
HGB BLD-MCNC: 15.1 G/DL (ref 13–17.5)
IMM GRANULOCYTES # BLD AUTO: 0.04 X10(3) UL (ref 0–1)
IMM GRANULOCYTES NFR BLD: 0.5 %
LDLC SERPL CALC-MCNC: 100 MG/DL (ref ?–100)
LYMPHOCYTES # BLD AUTO: 3.25 X10(3) UL (ref 1–4)
LYMPHOCYTES NFR BLD AUTO: 37.8 %
MCH RBC QN AUTO: 30.6 PG (ref 26–34)
MCHC RBC AUTO-ENTMCNC: 33.3 G/DL (ref 31–37)
MCV RBC AUTO: 91.9 FL (ref 80–100)
MICROALBUMIN UR-MCNC: 1.7 MG/DL
MICROALBUMIN/CREAT 24H UR-RTO: 12.4 UG/MG (ref ?–30)
MONOCYTES # BLD AUTO: 0.99 X10(3) UL (ref 0.1–1)
MONOCYTES NFR BLD AUTO: 11.5 %
NEUTROPHILS # BLD AUTO: 3.87 X10 (3) UL (ref 1.5–7.7)
NEUTROPHILS # BLD AUTO: 3.87 X10(3) UL (ref 1.5–7.7)
NEUTROPHILS NFR BLD AUTO: 45 %
NONHDLC SERPL-MCNC: 110 MG/DL (ref ?–130)
OSMOLALITY SERPL CALC.SUM OF ELEC: 294 MOSM/KG (ref 275–295)
PLATELET # BLD AUTO: 279 10(3)UL (ref 150–450)
POTASSIUM SERPL-SCNC: 4.3 MMOL/L (ref 3.5–5.1)
PROT SERPL-MCNC: 7.1 G/DL (ref 5.7–8.2)
PSA SERPL-MCNC: 1.04 NG/ML (ref ?–4)
RBC # BLD AUTO: 4.93 X10(6)UL (ref 3.8–5.8)
SODIUM SERPL-SCNC: 141 MMOL/L (ref 136–145)
TRIGL SERPL-MCNC: 51 MG/DL (ref 30–149)
TSI SER-ACNC: 2.51 UIU/ML (ref 0.55–4.78)
URATE SERPL-MCNC: 8.4 MG/DL (ref 3.7–9.2)
VLDLC SERPL CALC-MCNC: 8 MG/DL (ref 0–30)
WBC # BLD AUTO: 8.6 X10(3) UL (ref 4–11)

## 2025-06-06 PROCEDURE — 36415 COLL VENOUS BLD VENIPUNCTURE: CPT

## 2025-06-06 PROCEDURE — 84443 ASSAY THYROID STIM HORMONE: CPT

## 2025-06-06 PROCEDURE — 82043 UR ALBUMIN QUANTITATIVE: CPT

## 2025-06-06 PROCEDURE — 84550 ASSAY OF BLOOD/URIC ACID: CPT

## 2025-06-06 PROCEDURE — 80053 COMPREHEN METABOLIC PANEL: CPT

## 2025-06-06 PROCEDURE — 85025 COMPLETE CBC W/AUTO DIFF WBC: CPT

## 2025-06-06 PROCEDURE — 84153 ASSAY OF PSA TOTAL: CPT

## 2025-06-06 PROCEDURE — 82570 ASSAY OF URINE CREATININE: CPT

## 2025-06-06 PROCEDURE — 80061 LIPID PANEL: CPT

## 2025-06-06 NOTE — PROGRESS NOTES
Subjective:   Shawn Vargas is a 70 year old male who presents for a Medicare Initial Preventative Physical Exam (Welcome to Medicare- < 12 months on Medicare) and scheduled follow up of multiple significant but stable problems.   History of Present Illness    Patient presents for above.  Here for his welcome to Medicare physical.    History of high blood pressure on medications.  Readings at home are typically normal.  Denies any chest pain or shortness of breath with activities.  Medications.    History of hypercholesterolemia on dual therapy.  Needs levels rechecked.    History of hypothyroidism on medications.  Levels have been normal.  With medication without any side effects.    History of left hip replacement done in 2022 at [facility].  Follows with orthopedic surgery.    History of gout of his left big toe.  First occurred in approximately 2022.  It appears this was the only time this occurred.  Not on any chronic medications.    History of irritable bowel syndrome.  This has been well-controlled as of late.  Did take Linzess in the past but no longer doing so.  He is up-to-date on his colonoscopy.    History of acid reflux on medications.  This is relatively well-controlled as well now.  Has seen gastroenterology in the past.    History of anxiety on paroxetine.  This is well-controlled.    Per patient no longer needs colonoscopies.  He urinates 1-2 times nightly.    History/Other:   Fall Risk Assessment:   He has been screened for Falls and is High Risk. Fall Prevention information provided to patient in After Visit Summary.    Do you feel unsteady when standing or walking?: (Patient-Rptd) No  Do you worry about falling?: (Patient-Rptd) Yes  Have you fallen in the past year?: (Patient-Rptd) Yes  How many times have you fallen?: (Patient-Rptd) (P) 2  Were you injured?: (Patient-Rptd) (P) No     Cognitive Assessment:   He had a completely normal cognitive assessment - see flowsheet entries      Functional Ability/Status:   Shawn Vargas has some abnormal functions as listed below:  He has Vision problems based on screening of functional status. He has Walking problems based on screening of functional status.       Depression Screening (PHQ):  PHQ-2 SCORE: 0  , done 5/30/2025     Advanced Directives:   He does have a Living Will but we do NOT have it on file in Epic.    He does NOT have a Power of  for Health Care. [Do you have a healthcare power of ?: (Patient-Rptd) No]  Discussed Advance Care Planning with patient (and family/surrogate if present). Standard forms made available to patient in After Visit Summary.      Patient Active Problem List   Diagnosis    Iritis of right eye    Positive MARLENE (antinuclear antibody)    Age-related nuclear cataract of left eye    Acquired hypothyroidism    Primary hypertension    Gastroesophageal reflux disease without esophagitis    Anxiety    Hypercholesteremia    Pseudophakia, right eye    Irritable bowel syndrome with both constipation and diarrhea    Grade I hemorrhoids    Episcleritis of left eye    Floater, vitreous, bilateral    Microalbuminuria    Idiopathic chronic gout of left foot without tophus    Status post left hip replacement    Neoplasm of uncertain behavior of skin    Diverticulosis of colon    Conjunctivitis    Chronic sinusitis    Chronic frontal sinusitis    Benign neoplasm of skin of lower limb, including hip    Benign neoplasm of skin of upper limb, including shoulder     Allergies:  He is allergic to eucalyptus oil and terbinafine.    Current Medications:  Outpatient Medications Marked as Taking for the 6/6/25 encounter (Office Visit) with Blayne Santillan MD   Medication Sig    Wheat Dextrin (BENEFIBER OR) Take 2 capsules by mouth 2 (two) times daily.    NON FORMULARY True Calm - 1 capsule by mouth at bedtime.    Magnesium 200 MG Oral Tab Take 1 tablet (200 mg total) by mouth at bedtime.    Apoaequorin (PREVAGEN OR) Take 1  tablet by mouth in the morning.    ibuprofen 200 MG Oral Tab Take 2 tablets (400 mg total) by mouth every 8 (eight) hours as needed for Pain.    PAROXETINE HCL 40 MG Oral Tab TAKE 1 TABLET(40 MG) BY MOUTH EVERY MORNING    levothyroxine 100 MCG Oral Tab Take 1 tablet (100 mcg total) by mouth before breakfast.    amLODIPine 10 MG Oral Tab Take 1 tablet (10 mg total) by mouth daily.    atorvastatin 10 MG Oral Tab Take 1 tablet (10 mg total) by mouth nightly.    omeprazole 20 MG Oral Capsule Delayed Release Take 1 capsule (20 mg total) by mouth every morning.    cholecalciferol 1000 UNITS Oral Cap Take 1 capsule (1,000 Units total) by mouth daily. During the winter months.    GARLIC OR Take 1 capsule by mouth 2 (two) times daily.    Calcium Citrate (CITRACAL OR) Take 2 tablets by mouth daily.    acidophilus-pectin Oral Cap Take 1 capsule by mouth daily.    Multiple Vitamins-Minerals (CENTRUM SILVER) Oral Tab Take 1 tablet by mouth daily.    L-Lysine 1000 MG Oral Tab Take 1 tablet by mouth daily.    Omega 3 1000 MG Oral Cap Take 2 capsules by mouth in the morning and 1 capsule in the evening.    B Complex-C-Folic Acid (B COMPLEX + C TR) Oral Tab CR Take 2 tablets by mouth in the morning and 1 tablet in the evening.       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  Surgical History:  He  has a past surgical history that includes colonoscopy (2013); upper gi endoscopy,biopsy (2013); leg/ankle surgery proc unlisted (Right); Inguinal hernia repair (Right, 1963); Inguinal hernia repair (Left, 1970); Cataract extraction, extracapsular w/ intraocular lens implant (Right, 09/26/2017); and Yag Capsulotomy - OD - Right Eye (Right, 04/07/2018).   Family History:  His family history  includes Diabetes in his paternal uncle.  Social History:  He  reports that he quit smoking about 42 years ago. His smoking use included cigarettes. He has never been exposed to tobacco smoke. He has never used smokeless tobacco. He reports current alcohol use. He reports that he does not use drugs.    Tobacco:  He smoked tobacco in the past but quit greater than 12 months ago.  Tobacco Use[1]     CAGE Alcohol Screen:   He has been screened for alcohol abuse and his score is not 0:  Cut: Have you ever felt you should Cut down on your drinking?: (Patient-Rptd) Yes  Annoyed: Have people Annoyed you by criticizing your drinking?: (Patient-Rptd) No  Guilty: Have you ever felt bad or Guilty about your drinking?: (Patient-Rptd) No  Eye Opener: Have you ever had a drink first thing in the morning to steady your nerves or to get rid of a hangover (Eye opener)?: (Patient-Rptd) No  Total Score: (Patient-Rptd) 1      Patient Care Team:  Blayne Santillan MD as PCP - General (Internal Medicine)  Alex Dougherty MD as Consulting Physician (SURGERY, ORTHOPEDIC)  Rob Greenwood MD as Consulting Physician (GASTROENTEROLOGY)  Arnaldo Rausch MD as Consulting Physician (OPHTHALMOLOGY)    Review of Systems   Constitutional: Negative.    HENT: Negative.     Eyes: Negative.    Respiratory: Negative.     Cardiovascular: Negative.    Gastrointestinal: Negative.    Endocrine: Negative.    Genitourinary: Negative.    Musculoskeletal:  Positive for arthralgias.   Skin: Negative.    Allergic/Immunologic: Negative.    Neurological: Negative.    Hematological: Negative.    Psychiatric/Behavioral: Negative.       Objective:   Physical Exam  Constitutional:       Appearance: Normal appearance. He is well-developed.   HENT:      Head: Normocephalic.      Right Ear: Tympanic membrane, ear canal and external ear normal. There is no impacted cerumen.      Left Ear: Tympanic membrane, ear canal and external ear normal. There is no impacted  cerumen.   Eyes:      General: No scleral icterus.     Pupils: Pupils are equal, round, and reactive to light.   Neck:      Vascular: No JVD.   Cardiovascular:      Rate and Rhythm: Normal rate and regular rhythm.      Pulses: Normal pulses.      Heart sounds: Normal heart sounds. No murmur heard.  Pulmonary:      Effort: Pulmonary effort is normal. No respiratory distress.      Breath sounds: Normal breath sounds. No stridor. No wheezing, rhonchi or rales.   Chest:      Chest wall: No tenderness.   Abdominal:      General: Abdomen is flat. Bowel sounds are normal. There is no distension.      Palpations: Abdomen is soft.      Tenderness: There is no abdominal tenderness. There is no guarding or rebound.   Musculoskeletal:         General: Normal range of motion.      Cervical back: Normal range of motion.      Comments: BKA of right leg.   Lymphadenopathy:      Cervical: No cervical adenopathy.   Skin:     General: Skin is warm.   Neurological:      General: No focal deficit present.      Mental Status: He is alert.      Cranial Nerves: No cranial nerve deficit.   Psychiatric:         Mood and Affect: Mood normal.         Behavior: Behavior normal.       /62 (BP Location: Right arm, Patient Position: Sitting, Cuff Size: adult)   Pulse 69   Temp 97.4 °F (36.3 °C) (Temporal)   Resp 16   Ht 6' (1.829 m)   Wt 167 lb (75.8 kg)   SpO2 96%   BMI 22.65 kg/m²  Estimated body mass index is 22.65 kg/m² as calculated from the following:    Height as of this encounter: 6' (1.829 m).    Weight as of this encounter: 167 lb (75.8 kg).    Medicare Hearing Assessment:   Hearing Screening    Screening Method: Finger Rub  Finger Rub Result: Pass         Visual Acuity:   Right Eye Visual Acuity: Corrected Right Eye Chart Acuity: 20/30   Left Eye Visual Acuity: Corrected Left Eye Chart Acuity: 20/30   Both Eyes Visual Acuity: Corrected Both Eyes Chart Acuity: 20/30   Able To Tolerate Visual Acuity: Yes        Assessment &  Plan:   Shawn Vargas is a 70 year old male who presents for a Medicare Assessment.     1. Encounter for annual health examination (Primary)  -     CBC With Differential With Platelet; Future; Expected date: 06/06/2025  -     Comp Metabolic Panel (14); Future; Expected date: 06/06/2025  -     Lipid Panel; Future; Expected date: 06/06/2025  -     TSH W Reflex To Free T4; Future; Expected date: 06/06/2025  -     PSA Total, Diagnostic; Future; Expected date: 06/06/2025    2. Primary hypertension  -     Expanded, Low Complexity (01075)  -     Microalb/Creat Ratio, Random Urine; Future; Expected date: 06/06/2025  - Home readings are normal.    3. Hypercholesteremia  -     Expanded, Low Complexity (74918)  -     Comp Metabolic Panel (14); Future; Expected date: 06/06/2025  -     Lipid Panel; Future; Expected date: 06/06/2025    4. Acquired hypothyroidism  -     Expanded, Low Complexity (60768)  -     TSH W Reflex To Free T4; Future; Expected date: 06/06/2025    5. Anxiety  -     Expanded, Low Complexity (30192)  - Continue paroxetine.    6. Gastroesophageal reflux disease without esophagitis  -     Expanded, Low Complexity (47530)  - Continue omeprazole.    7. Irritable bowel syndrome with both constipation and diarrhea  -     Expanded, Low Complexity (70140)  - Well controlled.    8. Idiopathic chronic gout of left foot without tophus  -     Expanded, Low Complexity (81462)  -     Uric Acid; Future; Expected date: 06/06/2025    9. Microalbuminuria  -     Expanded, Low Complexity (87322)  -     Microalb/Creat Ratio, Random Urine; Future; Expected date: 06/06/2025    10. Status post left hip replacement  Overview:  2023  Orders:  -     Expanded, Low Complexity (21280)  - Doing well.  - Follow-up with orthopedic surgery as needed.    11. Colon cancer screening  - No longer requires.  Assessment & Plan      The patient indicates understanding of these issues and agrees to the plan.  Reinforced healthy diet, lifestyle,  and exercise.      Return in about 6 months (around 2025) for Routine Follow-Up.     Blayne Santillan MD, 2025     Supplementary Documentation:   General Health:  In the past six months, have you lost more than 10 pounds without trying?: (Patient-Rptd) 3 - Don't know  Has your appetite been poor?: (Patient-Rptd) No  Type of Diet: (Patient-Rptd) Balanced  How does the patient maintain a good energy level?: (Patient-Rptd) Stretching  How would you describe your daily physical activity?: (Patient-Rptd) Light  How would you describe your current health state?: (Patient-Rptd) Good  How do you maintain positive mental well-being?: (Patient-Rptd) Social Interaction, Games, Visiting Friends, Visiting Family  On a scale of 0 to 10, with 0 being no pain and 10 being severe pain, what is your pain level?: (Patient-Rptd) 3 - (Mild)  In the past six months, have you experienced urine leakage?: (Patient-Rptd) 0-No  At any time do you feel concerned for the safety/well-being of yourself and/or your children, in your home or elsewhere?: (Patient-Rptd) No  Have you had any immunizations at another office such as Influenza, Hepatitis B, Tetanus, or Pneumococcal?: (Patient-Rptd) No    Health Maintenance   Topic Date Due    COVID-19 Vaccine ( season) 2024    Annual Depression Screening  2025    Annual Physical  2025    PSA  06/10/2026    Colorectal Cancer Screening  2033    Influenza Vaccine  Completed    Fall Risk Screening (Annual)  Completed    Pneumococcal Vaccine: 50+ Years  Completed    Zoster Vaccines  Completed    Meningococcal B Vaccine  Aged Out          [1]   Social History  Tobacco Use   Smoking Status Former    Current packs/day: 0.00    Types: Cigarettes    Quit date: 1983    Years since quittin.4    Passive exposure: Never   Smokeless Tobacco Never

## 2025-06-06 NOTE — PROGRESS NOTES
The following individual(s) verbally consented to be recorded using ambient AI listening technology and understand that they can each withdraw their consent to this listening technology at any point by asking the clinician to turn off or pause the recording:    Patient name: Shawn Vargas  Additional names:  n/a

## 2025-06-06 NOTE — PATIENT INSTRUCTIONS
Prevention Guidelines, Men Ages 65 and Older  Screening tests and vaccines are an important part of managing your health.A screening test is done to find possible disorders or diseases in people who don't have any symptoms. The goal is to find a disease early so lifestyle changes can be made and you can be watched more closely to reduce the risk of disease, or to detect it early enough to treat it most effectively. Screening tests are not considered diagnostic, but are used to determine if more testing is needed.  Health counseling is essential, too. Below are guidelines for these, for men ages 65 and older. Talk with your healthcare provider to make sure you’re up-to-date on what you need.  Screening Who needs it How often   Abdominal aortic aneurysm Men ages 65 to 75 who have ever smoked 1 ultrasound   Alcohol misuse All men in this age group At routine exams   Blood pressure All men in this age group Yearly checkup if your blood pressure is normal  Normal blood pressure is less than 120/80 mm Hg  If your blood pressure reading is higher than normal, follow the advice of your healthcare provider   Colorectal cancer All men in this age group Flexible sigmoidoscopy every 5 years, or colonoscopy every 10 years, or double-contrast barium enema every 5 years; yearly fecal occult blood test or fecal immunochemical test; or a stool DNA test as often as your healthcare provider advises; talk with your healthcare provider about which tests are best for you and when you no longer need colonoscopies (generally after age 75)   Depression All men in this age group At routine exams   Type 2 diabetes or prediabetes All men beginning at age 45 and men without symptoms at any age who are overweight or obese and have 1 or more other risk factors for diabetes At least every 3 years (yearly if your blood sugar has already begun to rise)   Type 2 diabetes All men with prediabetes Every year   Hepatitis C Men at increased risk for  infection - talk with your healthcare provider At routine exams   High cholesterol or triglycerides All men in this age group At least every 5 years   HIV Men at increased risk for infection - talk with your healthcare provider At routine exams   Lung cancer Adults ages 55 to 80 who have smoked Yearly screening in smokers with 30 pack-year history of smoking or who quit within 15 years   Obesity All men in this age group At routine exams   Prostate cancer All men in this age group, talk to healthcare provider about risks and benefits of digital rectal exam (TERRY) and prostate-specific antigen (PSA) screening1 At routine exams   Syphilis Men at increased risk for infection - talk with your healthcare provider At routine exams   Tuberculosis Men at increased risk for infection - talk with your healthcare provider Ask your healthcare provider   Vision All men in this age group Every 1 to 2 years; if you have a chronic health condition, ask your healthcare provider if you needs exams more often   Vaccine Who needs it How often   Chickenpox (varicella) All men in this age group who have no record of this infection or vaccine 2 doses; second dose should be given at least 4 weeks after the first dose   Hepatitis A Men at increased risk for infection - talk with your healthcare provider 2 doses given at least 6 months apart   Hepatitis B Men at increased risk for infection - talk with your healthcare provider 3 doses over 6 months; second dose should be given 1 month after the first dose; the third dose should be given at least 2 months after the second dose and at least 4 months after the first dose   Haemophilus influenzae Type B (HIB) Men at increased risk for infection - talk with your healthcare provider 1 to 3 doses   Influenza (flu) All men in this age group  Once a year   Meningococcal Men at increased risk for infection - talk with your healthcare provider 1 or more doses   Pneumococcal conjugate vaccine (PCV13) and  pneumococcal polysaccharide vaccine (PPSV23) All men in this age group 1 dose of each vaccine   Tetanus/diphtheria/  pertussis (Td/Tdap) booster All men in this age group Td every 10 years, or Tdap if you will have contact with a child younger than 12 months old   Zoster All men in this age group 1 dose   Counseling Who needs it How often   Diet and exercise Men who are overweight or obese When diagnosed, and then at routine exams   Fall prevention (exercise, vitamin D supplements) All men in this age group At routine exams   Sexually transmitted infection Men at increased risk for infection - talk with your healthcare provider At routine exams   Use of daily aspirin Men ages 45 to 79 at risk for cardiovascular health problems At routine exams   Use of tobacco and the health effects it can cause All men in this age group Every visit   [address] [place] [address] [facility]   Date Last Reviewed: 2/1/2017  © 5983-3239 [employer]. [address] [place] [address]. All rights reserved. This information is not intended as a substitute for professional medical care. Always follow your healthcare professional's instructions.

## (undated) DIAGNOSIS — F32.89 OTHER DEPRESSION: ICD-10-CM

## (undated) NOTE — LETTER
AUTHORIZATION FOR SURGICAL OPERATION OR OTHER PROCEDURE    1.  I hereby authorize Dr. Cindy Tyson, and [facility] staff assigned to my case to perform the following operation and/or procedure at the [facility]:    ________________________ Patient Name:  ______________________________________________________  (please print)      Patient signature:  ___________________________________________________             Relationship to Patient:           []  Parent    Responsible person

## (undated) NOTE — MR AVS SNAPSHOT
Michael  [address]  [phone]               Thank you for choosing us for your health care visit with aWde Montes MD.  We are glad to serve you and happy to provide you with this summary Levothyroxine Sodium 100 MCG Tabs   Take 1 tablet (100 mcg total) by mouth before breakfast.   Commonly known as:  SYNTHROID           Meloxicam 7.5 MG Tabs   Take 1 tablet (7.5 mg total) by mouth daily.    Commonly known as:  Vance Andrews

## (undated) NOTE — LETTER
07/03/18        [place] [id number]      Dear Darby Must records indicate that you have outstanding lab work and or testing that was ordered for you and has not yet been completed:          CBC With [facility]

## (undated) NOTE — ED AVS SNAPSHOT
Oracio Eaton   MRN: [id number]    Department:  [facility] Emergency Department   Date of Visit:  4/20/2019           Disclosure     Insurance plans vary and the physician(s) referred by the ER may not be covered by your plan.  Please conta within the next three months to obtain basic health screening including reassessment of your blood pressure.     IF THERE IS ANY CHANGE OR WORSENING OF YOUR CONDITION, CALL YOUR PRIMARY CARE PHYSICIAN AT ONCE OR RETURN IMMEDIATELY TO THE EMERGENCY DEPARTMEN

## (undated) NOTE — ED AVS SNAPSHOT
Meghna Olivarez   MRN: [id number]    Department:  [facility] Emergency Department   Date of Visit:  9/22/2019           Disclosure     Insurance plans vary and the physician(s) referred by the ER may not be covered by your plan.  Please conta within the next three months to obtain basic health screening including reassessment of your blood pressure.     IF THERE IS ANY CHANGE OR WORSENING OF YOUR CONDITION, CALL YOUR PRIMARY CARE PHYSICIAN AT ONCE OR RETURN IMMEDIATELY TO THE EMERGENCY DEPARTMEN

## (undated) NOTE — MR AVS SNAPSHOT
Michael  [address]  [phone]               Thank you for choosing us for your health care visit with Janeth Cabrera MD.  We are glad to serve you and happy to provide you with this summa L-Lysine 1000 MG Tabs   Take 1 tablet by mouth 2 (two) times daily.            Levothyroxine Sodium 100 MCG Tabs   Take 1 tablet (100 mcg total) by mouth before breakfast.   Commonly known as:  SYNTHROID           Methylcellulose (Laxative) 500 MG Tabs   T

## (undated) NOTE — LETTER
06/17/19        [place] [id number]      Dear Amanda Arce records indicate that you have outstanding lab work and or testing that was ordered for you and has not yet been completed:  Orders Placed This Encounte

## (undated) NOTE — MR AVS SNAPSHOT
Michael  [address]  [phone]               Thank you for choosing us for your health care visit with Emily Pardo MD.  We are glad to serve you and happy to provide you with this summary This list is accurate as of: 4/10/17 12:21 PM.  Always use your most recent med list.                AmLODIPine Besylate 10 MG Tabs   Take 1 tablet (10 mg total) by mouth daily.    Commonly known as:  NORVASC           B COMPLEX + C TR Tbcr   Take  by mouth Phone:  [phone]    - AmLODIPine Besylate 10 MG Tabs  - Dicyclomine HCl 20 MG Tabs  - Levothyroxine Sodium 100 MCG Tabs  - omeprazole 20 MG Cpdr  - PARoxetine HCl 20 MG Tabs            MyChart     Call the kong for assistance with your inactive M

## (undated) NOTE — LETTER
October 17, 2017     [address] [address]      Dear Eva Prather:    Below are the results from your recent visit:    Resulted Orders   PSA SCREEN   Result Value Ref Range    PSA 0.7 0.0 - 4.0 ng/mL   ASSAY, THYROID ST in order to reduce these numbers and your overall risk of having an adverse cardiac event in the future. The TSH thyroid blood level is normal. The thyroid gland is functioning normally. Your PSA level is within normal range.  There is no evidence of

## (undated) NOTE — MR AVS SNAPSHOT
Michael  [address]  [phone]               Thank you for choosing us for your health care visit with Jacinto Bradshaw MD.  We are glad to serve you and happy to provide you with this summa Take 1 tablet by mouth 2 (two) times daily.            Levothyroxine Sodium 100 MCG Tabs   Take 1 tablet (100 mcg total) by mouth before breakfast.   Commonly known as:  SYNTHROID           Meloxicam 7.5 MG Tabs   Take 1 tablet (7.5 mg total) by mouth daily

## (undated) NOTE — MR AVS SNAPSHOT
Michael  [address]  [phone]               Thank you for choosing us for your health care visit with Truong Garcia MD.  We are glad to serve you and happy to provide you with this summa B COMPLEX + C TR Tbcr   Take  by mouth 3 (three) times daily. CENTRUM SILVER Tabs   Take 1 tablet by mouth daily. Dicyclomine HCl 20 MG Tabs   Take 1 tablet (20 mg total) by mouth 4 (four) times daily.  Take four times daily PRN   Commo If you have questions, you can call [phone] to talk to our [facility] Staff. Remember, ONtheAIR is NOT to be used for urgent needs. For medical emergencies, dial 911. Visit [email or web address] to learn more.            Visit [facility]

## (undated) NOTE — ED AVS SNAPSHOT
[facility] Emergency Department    [address]     [address] [address]    Phone:  [phone]    Fax:  [phone]           Mark Fisherfield   MRN: [id number]    Department:  [facility] Emergency Department   Date of Visit:  2 Ice to hip--alternating with warm moist heat--20 min intervals every 2-3 hours  No motrin aleve or ibuprofen while taking mobic  followup with orthopedics if persistent pain    Discharge References/Attachments     ARTHRITIS, WHAT IS? (ENGLISH)    HIP, HOW and Class Registration line at [phone] or find a doctor online by visiting [email or web address].    IF THERE IS ANY CHANGE OR WORSENING OF YOUR CONDITION, CALL YOUR PRIMARY CARE PHYSICIAN AT ONCE OR RETURN IMMEDIATELY TO [address] [place] [address].     If harming yourself, contact [facility] at [phone]. - If you don’t have insurance, Clarisse Savage has partnered with Patient [address] to help you get signed up for insurance coverage.   Patient [place]

## (undated) NOTE — ED AVS SNAPSHOT
[facility] Emergency Department    [address]     [address] [address]    Phone:  [phone]    Fax:  [phone]           Sabas Simms   MRN: [id number]    Department:  [facility] Emergency Department   Date of Visit:  2 and Class Registration line at [phone] or find a doctor online by visiting [email or web address].    IF THERE IS ANY CHANGE OR WORSENING OF YOUR CONDITION, CALL YOUR PRIMARY CARE PHYSICIAN AT ONCE OR RETURN IMMEDIATELY TO [address] [place] [address].     If

## (undated) NOTE — MR AVS SNAPSHOT
Michael  [address]  [phone]               Thank you for choosing us for your health care visit with Ariela Martins MD.  We are glad to serve you and happy to provide you with this summa CENTRUM SILVER Tabs   Take 1 tablet by mouth daily. Dicyclomine HCl 20 MG Tabs   Take 1 tablet (20 mg total) by mouth 4 (four) times daily.  Take four times daily PRN   Commonly known as:  BENTYL           Fluticasone Propionate 50 MCG/ACT Susp Visit [email or web address] org to learn more. Educational Information     Healthy Diet and Regular Exercise  [employer] for making healthy food choices  -   Enjoy your food, but eat less.   Fully enjoy your food when ea